# Patient Record
Sex: FEMALE | Race: BLACK OR AFRICAN AMERICAN | Employment: UNEMPLOYED | URBAN - METROPOLITAN AREA
[De-identification: names, ages, dates, MRNs, and addresses within clinical notes are randomized per-mention and may not be internally consistent; named-entity substitution may affect disease eponyms.]

---

## 2020-11-18 ENCOUNTER — HOSPITAL ENCOUNTER (OUTPATIENT)
Dept: PSYCHIATRY | Age: 25
Setting detail: THERAPIES SERIES
Discharge: HOME | End: 2020-11-18
Payer: COMMERCIAL

## 2020-11-18 PROCEDURE — 80305 DRUG TEST PRSMV DIR OPT OBS: CPT

## 2020-11-18 PROCEDURE — 90791 PSYCH DIAGNOSTIC EVALUATION: CPT

## 2020-11-18 RX ORDER — ARIPIPRAZOLE 20 MG/1
20 TABLET ORAL DAILY
COMMUNITY
End: 2021-10-08

## 2020-11-18 ASSESSMENT — PATIENT HEALTH QUESTIONNAIRE - PHQ9: SUM OF ALL RESPONSES TO PHQ QUESTIONS 1-9: 0

## 2020-11-18 ASSESSMENT — LIFESTYLE VARIABLES: HISTORY_ALCOHOL_USE: NO

## 2020-11-18 NOTE — PLAN OF CARE
This note will not be viewable in [employer] for the following reason(s). Suspected substance abuse disorder. [facility]- Level of Care Placement      [x]Admissions  []Continued Stay []Discharge/Transfer / Complication in [address] [address] HOLS:92/14/4117    Client Sticker      Level of Care Level 1      Outpatient Services Level 2.1   Intensive Outpatient Services(IOP) Level 2.5   Partial Hospitalization Services Level 3.1 CLINICALLY Managed Low-Intensity Residential Services Level 3.3  CLINICALLY Managed Population- Specific High- Intensity Residential Services Level 3.5  CLINICALLY Managed High Intensive Residential Services Level 3.7  MEDICALLY Monitored Intensive Inpatient Services Level 4  MEDICALLY Managed Intensive Inpatient Services   Dimension 1  Acute Intoxication and/or Withdrawal Potential [x] Not experiencing significant withdrawal    [] Minimal  risk of severe  withdrawal [] Minimal  risk of severe  withdrawal    [] Manageable  at Level 2-WM [] Moderate  risk of severe withdrawal    [] Manageable at Level 2-WM [] No withdrawal risk or minimal or stable withdrawal     [] Concurrently receiving Level -WM or Level 2-WM services [] Minimal risk of severe withdrawal    [] If withdrawal is present, manageable at Level 3. 2-WM  [] Minimal risk of severe withdrawal    [] If withdrawal is present manageable at Level 3. 2-WM [] High risk of withdrawal, but manageable at Level  3.7-WM and does not require  full resources of a licensed hospital [] At high risk of withdrawal and requires Level 4- and full resources of licensed hospital    COMMENTS:           Dimension 2   Biomedical Conditions and Complications  (BMC/C) [x] None or very stable    [] Receiving concurrent medical monitoring  [] None or not a distraction from treatment    [] Problems are manageable at Level 2.1 [] None or not sufficient to distract from treatment    [] Problems are manageable at  Level 2.5 [] None or stable    [] Receiving concurrent medical monitoring  [] None or stable    [] Receiving concurrent medical monitoring  [] None or stable    [] Receiving concurrent medical monitoring [] Requires 24-hour medical monitoring  but not intensive treatment [] Requires 24-hour medical & nursing care and the full  resources of a licensed hospital   COMMENTS:           Dimension 3  Emotional,  Behavioral,  or Cognitive Conditions and Complications   (EBC/C) [] None or very stable    [x] Receiving concurrent mental health monitoring [] Mild severity  with potential to distract from recovery; needs monitoring [] Mild to moderate severity, with potential to distract from recovery, needs stabilization [] None or minimal; not distracting to recovery    [] If  stable, a    co-occurring capable program is appropriate    [] If not stable, a co-occurring enhanced program is required [] Mild to moderate severity; needs structure to focus on recovery. Treatment should be designed to address significant cognitive deficits     [] If  stable, a  co-occurring capable program is appropriate    [] If not stable, a co-occurring enhanced program is required [] Demonstrates repeated inability to control impulses or unstable & dangerous signs/ symptoms require stabilization. Other functional deficits require stabilization and 24-hr.  setting to prepare for community integration  & continuing care    [] Co-occurring enhanced setting is required for those with severe & chronic mental illness [] Moderate severity;  needs 24-hour   structured setting    [] If client has co-occurring mental disorder, requires concurrent mental health services in a medically monitored setting  [] Severe and unstable problems;  requires 24-hour psychiatric care  with concomitant addiction treatment   COMMENTS:             Electronically signed by Marc Sandoval, TRACI, ISIDRO on 11/18/2020 at 3:36 PM               [address] [place] [address] Level of Care Placement      [x]Admissions  []Continued Stay []Discharge/Transfer / Complication in [address] [id number]:[id number]/[id number]    Client Sticker      Level of Care Level 1  Outpatient   Services Level 2.1  Intensive  Outpatient  Services Level 2.5  Partial Hospitalization Services Level 3.1  CLINICALLY Managed Low-intensity Residential Services Level 3.3  CLINICALLY Managed Population- Specific High- Intensity Residential Services Level 3.5  CLINICALLY Managed High-Intensive Residential Services Level 3.7  MEDICALLY Monitored Intensive Inpatient Services Level 4  MEDICALLY Managed Intensive Inpatient Services   Dimension 4  Readiness  To  Change [] Ready for recovery but needs motivating and monitoring strategies to strengthen readiness    []Needs ongoing monitoring and disease management    [] High severity in this dimension but not in other dimensions. Needs Level 1 motivational enhancement strategies   [x] Has variable engagement in treatment, ambivalence, or lack of awareness of substance use or mental health problems and requires structured program several times/wk. to promote progress through stages of change [] Has poor engagement in treatment, significant ambivalence, or lack of awareness of substance use or mental health problems, requires near daily structured program or intensive engagement to promote progress through stages of change [] Open to recovery, but needs structured environment to maintain therapeutic gains [] Has little awareness & needs interventions at Level 3.3 to engage & stay in treatment.     [] If there is high severity in this dimension, but not in any other dimension, motivational enhancement strategies should be provided in Level 1 [] Has marked difficulty with, or opposition to treatment with dangerous consequences    [] If there is high severity in this dimension, but not in any other dimension, motivational enhancement strategies should be provided in Level 1 [] Low interest in treatment 6  Recovery/Living Environment []  Recovery environment is supportive and/or the client has skills to cope [x] Recovery environment is not supportive  but with structure and support, the client can cope [] Recovery environment is not supportive, but with structure and support and relief from the home environment, client can cope [] Environment    is dangerous, but recovery is achievable if Level 3.1 24-hour structure is available  [] Environment is dangerous and  client needs 24-hour structure to learn to cope [] Environment is dangerous, and the client lacks skills to cope outside of a  highly structured 24-hour setting   [] Environment is dangerous, and the client lacks skills to cope outside of a  highly structured 24-hour setting [] Problems in this dimension do not qualify the client for Level 4 services    [] If the client's only severity is in Dimension 4,5, and/or 6 without high severity in Dimensions 1,2, and/or 3, then client is not qualified for Level 4   COMMENTS:               Electronically signed by TRACI Herndon, ISIDRO on 11/18/2020 at 3:36 PM

## 2020-11-18 NOTE — CARE COORDINATION
This note will not be viewable in [employer] for the following reason(s). Suspected substance abuse disorder. Patient PHQ 9 Score: 0  Interpretation of Total Score Depression Severity: 1-4 = Minimal depression, 5-9 = Mild depression, 10-14 = Moderate depression, 15-19 = Moderately severe depression, 20-27 = Severe depression. Patient's PHQ9 will be monitored as treatment proceeds.     Electronically signed by TRACI Herndon LSW on 11/18/2020 at 3:22 PM

## 2020-11-18 NOTE — CARE COORDINATION
This note will not be viewable in [employer] for the following reason(s). Suspected substance abuse disorder. Care Coordination Note:    Patient's Plan of Care: To attend the IOP program for Cannibals use.     Electronically signed by TRACI Mota, GWENW on 11/18/2020 at 3:34 PM negative...

## 2020-11-18 NOTE — CARE COORDINATION
This note will not be viewable in [employer] for the following reason(s). Suspected substance abuse disorder. Biopsychosocial Assessment Note    Name: America Rivera  Date: 11/18/2020  Start Time: 10:30  End Time:11:30    Social work met with patient to complete the biopsychosocial assessment and CSSR-S. Mental Status Exam: Patient presented with a jovial (immature) affect and stable mood. She noted she is on Abilify for Bipolar, but is not sure what time of the day her medication is to be taken daily. She was cooperative and pleasant with the assessment. She was oriented X 4 and was able to stay on topic. Presenting Problem: Patient was sent here by Marcelo Joseph because the patient was arrested for theft in a store and because the patient is a sporadic cannabis user. Patient Report and Notes: Patient noted she is here for an assessment by her PO. The patient noted the only drug she has abused is cannabis and the last time she used was close to a month ago. She noted she is not a daily or weekly user, but on occasion, less than a month and she will use with a friend and take one puff. She noted her use is influenced by peer pressure. She was screened today. She was taken aback as we informed her she would have to participate in our program, but she did agree to do so after consulting with Ekaterina Doss and myself.  The patient sees a nurse practioner at [facility] [place], she had been seeing a therapist and disclosed information to her, then the therapist quit and the patient is disgruntled about seeing a new therapist.     Gender  [] Male [x] Female [] Transgender  [] Other    Sexual Orientation    [x] Heterosexual [] Homosexual [] Bisexual [] Other    Suicidal Ideation  [] Reports   [x] Denies    Homicidal Ideation  [] Reports   [x] Denies      Hallucinations/Delusions   [] Reports   [x] Denies     Substance Use/Alcohol Use/Addiction  [x] Reports    [] Denies     Trauma History  [] Reports    [x] Denies     Plan of Care: Participate in IOP and attending group meetings in the community. Patient Goal: To participate in the IOP program    Patient PHQ 9 Score: 0  Interpretation of Total Score Depression Severity: 1-4 = Minimal depression, 5-9 = Mild depression, 10-14 = Moderate depression, 15-19 = Moderately severe depression, 20-27 = Severe depression  Her PHQ9 will be monitored as treatment proceeds. Preliminary Diagnosis and Criteria: F12.10        If session conducted via telehealth:    This session was conducted via: [] Video [] Telephone  Patient Location:  [] Treatment Center [] Home [] Other  Provider Location: [] Treatment Center [] Home [] Other    Signed: Isaiah Downing               11/18/2020

## 2020-11-24 ENCOUNTER — HOSPITAL ENCOUNTER (OUTPATIENT)
Dept: PSYCHIATRY | Age: 25
Setting detail: THERAPIES SERIES
Discharge: HOME | End: 2020-11-24
Payer: COMMERCIAL

## 2020-11-24 PROCEDURE — H0015 ALCOHOL AND/OR DRUG SERVICES: HCPCS

## 2020-11-24 NOTE — GROUP NOTE
This note will not be viewable in [facility] for the following reason(s). Suspected substance abuse disorder. MODES OF INTERVENTION: UNIVERSALITY, CATHARSIS, EXISTENTIAL FACTORS, ALTRUISM, AND INTERPERSONAL LEARNING. TOPIC: HOW SPIRITUALITY CAN HELP RECOVERY AND HOW SUBSTANCES EFFECTS SEX AND DECISION MAKING. OBJECTIVE: LEARNING WAYS TO AVOID RELAPSE AND MAINTAINING SOBER LIVING      This virtual group therapy visit was conducted via:     [] Telephone    [x] Video      Patient Location:     [x] Patient's Home  [] Other:      Provider Location (City/[facility]):     [facility]   [x] Nikos Brown         Date: 11-24-20    Start Time: 10:30    End Time:  11:55    Number of Participants: 7    Type of Group: Relapse Prevention    Patient's Goal:  To complete the IOP program    Notes:  As far a sexual intimacy and drug use Danae strongly denies she has used for sexual intimacy. She had used by peer pressure in the past.     She noted for spirituality she has been raised in the [employer] and focuses on self love, family and friends for support. She prays often to help her make the right decisions for her. Danae was active in this group being it was her first group. Status After Intervention:  Improved    Participation Level:  Active Listener and Interactive    Participation Quality: Appropriate, Attentive and Sharing    Speech:  normal    Thought Process/Content: Logical    Affective Functioning: Congruent    Mood: euthymic    Level of consciousness:  Alert, Oriented x4 and Attentive    Response to Learning: Able to verbalize current knowledge/experience, Able to verbalize/acknowledge new learning, Able to retain information, Capable of insight, Able to change behavior and Progressing to goal    Endings: None Reported    Modes of Intervention: Education, Support, Socialization, Exploration and Clarifying    Discipline Responsible: /Counselor       Electronically signed by TRACI Rodriguez, LSW on 11/24/2020 at 3:39 PM

## 2020-11-24 NOTE — GROUP NOTE
This note will not be viewable in [employer] for the following reason(s). Suspected substance abuse disorder. This virtual group therapy visit was conducted via:     [] Telephone    [x] Video      Patient Location:     [x] Patient's Home  [] Other:      Provider Location (City/[place]):     [] [place] [address] [place]         Date:  11-24-20    Start Time: 9:00    End Time:  10:15    Number of Participants: 7    Type of Group: Psychotherapy    Patient's Goal:  To attend IOP per her PO. Modes of intervention:instillation of hope,altruism, group cohesiveness, universality and interpersonal learning. Topic: Being Grateful  Objective: How gratefulness can help in recovery      Notes: This was Danae's first session. She was welcomed by other group members. She noted she was grateful for her family and friends. She denied the holidays are a trigger for her to use. He most recent drug test was negative and she was cheerful for the negative result. She noted her cannabis use is less than once per month and it is influenced by peer pressure. She is planning a joyous Thanksgiving with her family and prefers to eat seafood rather than traditional turkey. She did fairly well in group as she let other group members take the lead, but she did participate when asked to. She had not participated in any meetings so far. Status After Intervention:  Improved    Participation Level:  Active Listener and Interactive    Participation Quality: Appropriate, Attentive, Sharing and Supportive    Speech:  normal    Thought Process/Content: Logical    Affective Functioning: Congruent    Mood: anxious and euthymic    Level of consciousness:  Alert, Oriented x4 and Attentive    Response to Learning: Able to verbalize current knowledge/experience, Able to verbalize/acknowledge new learning, Able to retain information, Capable of insight, Able to change behavior and Progressing to goal    Endings: None Reported    Modes of

## 2020-12-01 ENCOUNTER — HOSPITAL ENCOUNTER (OUTPATIENT)
Dept: PSYCHIATRY | Age: 25
Setting detail: THERAPIES SERIES
Discharge: HOME | End: 2020-12-01
Payer: COMMERCIAL

## 2020-12-01 PROCEDURE — H0015 ALCOHOL AND/OR DRUG SERVICES: HCPCS

## 2020-12-01 NOTE — GROUP NOTE
This note will not be viewable in [employer] for the following reason(s). Suspected substance abuse disorder. Date-12-1-20  Topic Stress and emotional well being  Objective: To learn how to manage stress in preventing relapse  Modes of Interventions: Installation of hope  Imparting of information, altruism, correction of the primary family, and socialization. Number of participants: 7      This virtual group therapy visit was conducted via:     [] Telephone    [x] Video      Patient Location:     [x] Patient's Home  [] Other:      Provider Location (City/[facility]):     [] [facility]   [x] [facility]         Date:  12-1-20    Start Time: 10:30    End Time:  12:00    Number of Participants: 7    Type of Group: Relapse Prevention    Patient's Goal:  To successfully complete the program and stay sober. Notes:  Danae noted she was a social user and never used on her own or bought the cannabis. She felt peer pressure to use. She notes her stressors which may encourage use is anxiety and the covid crisis. She is not restricting herself from the covid and does go out of the home. She has two meetings to attend and is restricted to attend because of the covid. She has no urges to use and was not a moderate or sever user of cannabis. Status After Intervention:  Improved    Participation Level:  Active Listener and Interactive    Participation Quality: Appropriate, Attentive and Sharing    Speech:  normal    Thought Process/Content: Logical    Affective Functioning: Congruent    Mood: euthymic    Level of consciousness:  Alert, Oriented x4 and Attentive    Response to Learning: Able to verbalize current knowledge/experience, Able to verbalize/acknowledge new learning, Able to retain information, Capable of insight, Able to change behavior and Progressing to goal    Endings: None Reported    Modes of Intervention: Education, Support and Socialization    Discipline Responsible: /Counselor Electronically signed by TRACI Paredes, LSW on 12/1/2020 at 3:46 PM

## 2020-12-01 NOTE — GROUP NOTE
This note will not be viewable in [employer] for the following reason(s). This is a Psychotherapy Note. This virtual group therapy visit was conducted via:     [] Telephone    [x] Video      Patient Location:     [x] Patient's Home  [] Other:      Provider Location ([facility]/State):     [] [facility]   [x] Darrius Matos       Start Time: 900  End Time: 2553   Number of participants:6  Type of group: Psychotherapy  Mode of intervention: Education, Support, Socialization, Exploration, Clarifying, Problem-solving, Confrontation, Limit-setting, and Reality-testing  Topic: Psychotherapy - Emotional well-being & managing stress    Objective: To explore positive coping skills to manage stress without substance use    Note: Danae actively participated in group therapy session. She presented inappropriate with laying in bed and having on lingerie. Client was directed to go off zoom and re-connect with group once her appearance was appropriate for group. Danae presented in zoom distracted by her phone and other things in home and had to be re-directed several times in the sessions. When present in the session she  verbalized positive coping skills of watching comedies, and changing her thoughts on her stress as coping skills. Danae verbalized she has never used a substance to cope with stress. Status after intervention:Improved  Participation level:  Active Listener, Interactive and Minimal  Participation Quality: Attentive, Sharing and Inappropriate  Speech: normal  Thought Process/Content:Logical  Mood/Affect: congruent  Self report: None Reported  Response to learning: Able to verbalize current knowledge/experience, Able to verbalize/acknowledge new learning, Able to retain information, Capable of insight and Able to change behavior  Discipline Responsible: [place]  Electronically signed by Ashutosh Mena on 45/5/9283 at 11:37 AM

## 2020-12-02 NOTE — PLAN OF CARE
Client discussed in treatment team today.     Present:   Dr. Deja Baca M.D., Ena  [facility], [place] [address] [address]     Current Status: IOP      Treatment goals:    [] Relapse prevention  [x] Increase sober support  [] Increase attendance in sober support groups  [] [place] effectively with cravings and urges  [x] Other: Social Interaction   Recommendation: Danae is a new admission and is recommended to continue with program and complete 8 day education sessions.      Electronically signed by Katie Lou 320 on 00/0/8449 at 2:42 PM

## 2020-12-03 ENCOUNTER — HOSPITAL ENCOUNTER (OUTPATIENT)
Dept: PSYCHIATRY | Age: 25
Setting detail: THERAPIES SERIES
Discharge: HOME | End: 2020-12-03
Payer: COMMERCIAL

## 2020-12-03 PROCEDURE — 99203 OFFICE O/P NEW LOW 30 MIN: CPT | Performed by: FAMILY MEDICINE

## 2020-12-03 PROCEDURE — 80305 DRUG TEST PRSMV DIR OPT OBS: CPT

## 2020-12-03 PROCEDURE — H0015 ALCOHOL AND/OR DRUG SERVICES: HCPCS

## 2020-12-03 NOTE — H&P
[address]                              HISTORY AND PHYSICAL    PATIENT NAME: Deven Boudreaux                      :        1995  MED REC NO:   [id number]                            ROOM:  ACCOUNT NO:   [de-identified]                           ADMIT DATE: 2020  PROVIDER:     Gurinder Saleem MD    HISTORY OF PRESENT ILLNESS:  A 19-year-old female, admitted with a  history of chemical dependency. The patient states she has been using  marijuana for the last seven to eight years, has been using it once or  twice a month. Has been tested positive by the probation officers, they  have been sending for evaluation and treatment. The patient does not  give any relevant medical or surgical history. Does not give any  relevant family or social history. REVIEW OF SYSTEMS:  HEENT:  Normal.  HEART:  Normal.  LUNGS:  Normal.   ABDOMEN:  Normal:  EXTREMITIES:  Normal.  NEUROLOGIC:  Normal.    PHYSICAL EXAMINATION:  GENERAL:  At the time of admission, the patient is alert and conscious,  in no acute distress. Average built and nourished. No signs of anemia. VITAL SIGNS:  Temperature normal, pulse rate 80 per minute, respirations  20 per minute, and blood pressure 120/80. HEENT:   Within normal limits. HEART:  Normal sinus rhythm. No audible murmurs. LUNGS:  Clear. Fair, equal, and good air entry. ABDOMEN:  Soft, no organomegaly. No masses. EXTREMITIES:  Within normal limits. NEUROLOGICAL:  The patient is stable. FINAL DIAGNOSIS:  Chemical dependency. PLAN OF TREATMENT:  Rehabilitation and aftercare as per the counselor.    The patient is medically stable to attend outpatient treatment program.        Dylan Aleman MD    D: 2020 11:27:29       T: 2020 12:24:43     [email or web address]  Job#: [id number]     Doc#: [id number]    CC:

## 2020-12-03 NOTE — GROUP NOTE
Danae did not attend the first session of Psychotherapy due to transportation issues.   Electronically signed by Katie Burgos 320 on 76/7/7176 at 2:33 PM

## 2020-12-03 NOTE — GROUP NOTE
This note will not be viewable in [employer] for the following reason(s). Suspected substance abuse disorder. [id number]  Start Time 10:45  End Time: 12:00  Number of participants:7  Type of group: Relapse prevention  Goal: understanding addiction  Objective: processing an educational film on Alcoholic Anonymous  Mode of intervention:universally, installation of hope, altruism socialization and catharsis. Note: Danae had good insight into the educational film where the alcoholic had been controlled by the alcohol. Even though her use was not to the extenet of this  alcoholics she processed the dangers that substances can have over people. Status after intervention:Improved  Participation level:  Active Listener and Interactive  Participation Quality: Appropriate, Attentive and Sharing  Speech: normal  Thought Process/Content:Logical  Mood/Affect: calm, congruent and spontaneous  Self report: None Reported  Response to learning: Able to verbalize current knowledge/experience, Able to verbalize/acknowledge new learning, Able to retain information, Capable of insight, Able to change behavior and Progressing to goal  Discipline Responsible: /Counselor     Electronically signed by TRACI Rose, ISIDRO on 12/3/2020 at 3:05 PM

## 2020-12-08 ENCOUNTER — HOSPITAL ENCOUNTER (OUTPATIENT)
Dept: PSYCHIATRY | Age: 25
Setting detail: THERAPIES SERIES
Discharge: HOME | End: 2020-12-08
Payer: COMMERCIAL

## 2020-12-08 PROCEDURE — H0015 ALCOHOL AND/OR DRUG SERVICES: HCPCS

## 2020-12-08 NOTE — GROUP NOTE
Start Time: 900  End Time: 1200   Number of participants:6  Type of group: Psychotherapy  Mode of intervention: Education, Support, Socialization, Exploration, Clarifying, Problem-solving, Confrontation, Limit-setting, and Reality-testing  Topic: Psychotherapy - Character defects   Objective: To explore character flaws that hinder recovery efforts and develop skills to eliminate these defects. Note: Danae attended the session 20 minutes late and was given verbal warning regardning her poor time management as it relates to attending group. She actively participated in the group session. she presented cooperative and quiet. She identified her character defect as being          \" a follower\" He identified and processed how being \"a follower\" led to her marijuana use and being with negative peers. She identified and processed removing her self from people that use drugs and to not be a follower of negative behaviors as skills to remove her defect. Status after intervention:Improved  Participation level:  Active Listener and Interactive  Participation Quality: Appropriate, Attentive and Sharing  Speech: normal  Thought Process/Content:Logical  Mood/Affect: quiet  Self report: None Reported  Response to learning: Able to verbalize current knowledge/experience, Able to verbalize/acknowledge new learning, Able to retain information, Capable of insight and Able to change behavior  Discipline Responsible: Katie Tanner 320  Electronically signed by Eber Maldonado on 93/4/3067 at 1:05 PM

## 2020-12-08 NOTE — GROUP NOTE
This note will not be viewable in [email or web address] for the following reason(s). Suspected substance abuse disorder. Date: 12-8-20  Start Time:11:00  End Time: 12:00  number of participants: 6  Mode of interventions: socialization, group cohesiveness, interpersonal learning, catharsis, and imparting of information. Topic: Stress and Emotional well being  Objective: evaluating healthy thinking patterns vs unhealthy thinking patterns. Note: Danae had good insight into group. She noted life is stressful and our responses to it have not always been good. We processed four coping skills, repression, regression, emotional insulation and intellectualism. Her response was appropriate as she was able to distinguish the differences between the  Four types of defense mechanisms. Status after intervention:Improved  Participation level:  Active Listener and Interactive  Participation Quality: Appropriate, Attentive and Sharing  Speech: normal  Thought Process/Content:Logical  Mood/Affect: calm, congruent and spontaneous  Self report: None Reported  Response to learning: Able to verbalize current knowledge/experience, Able to verbalize/acknowledge new learning, Able to retain information, Capable of insight, Able to change behavior and Progressing to goal  Discipline Responsible: /Counselor     Electronically signed by TRACI Rodriguez, GWENW on 12/8/2020 at 1:49 PM

## 2020-12-10 ENCOUNTER — HOSPITAL ENCOUNTER (OUTPATIENT)
Dept: PSYCHIATRY | Age: 25
Setting detail: THERAPIES SERIES
Discharge: HOME | End: 2020-12-10
Payer: COMMERCIAL

## 2020-12-10 PROCEDURE — H0015 ALCOHOL AND/OR DRUG SERVICES: HCPCS

## 2020-12-10 NOTE — GROUP NOTE
This note will not be viewable in [employer] for the following reason(s). Suspected substance abuse disorder. Start Time: 1030   End Time: 1145  End Number of participants:3  Type of group: Relapse Prevention  Mode of intervention: Education, Support, Socialization, Exploration, Clarifying, Problem-solving, Media, Confrontation, Limit-setting, and Reality-testing  Topic: Building Sober Support  Objective: To increase clients awareness on how sober support can aid relapse prevention by decreasing isolation and providing positive peers to interact with  to avoid continued association with using peers. Note: Danae actively participated in group session. She presented cooperative and appropriate. Client was asked to view education \" [employer] viewed education and verbalized the benefits of building sober support as related to her own life and she identified how being with positive peers will not tempt her to use marijuana and will support her sober lifestyle and help get off probation. Status after intervention:Improved  Participation level:  Active Listener and Interactive  Participation Quality: Appropriate, Attentive, Sharing and Supportive  Speech: normal  Thought Process/Content:Logical  Mood/Affect: brightens and congruent  Self report: None Reported  Response to learning: Able to verbalize current knowledge/experience, Able to verbalize/acknowledge new learning, Able to retain information, Capable of insight and Able to change behavior  Discipline Responsible: [place]  Electronically signed by Catalina Laurent on 18/62/8830 at 3:45 PM

## 2020-12-14 ENCOUNTER — HOSPITAL ENCOUNTER (OUTPATIENT)
Dept: PSYCHIATRY | Age: 25
Setting detail: THERAPIES SERIES
Discharge: HOME | End: 2020-12-14
Payer: COMMERCIAL

## 2020-12-14 PROCEDURE — H0015 ALCOHOL AND/OR DRUG SERVICES: HCPCS

## 2020-12-14 NOTE — GROUP NOTE
This note will not be viewable in [employer] for the following reason(s). Suspected substance abuse disorder. Start Time: 1030  End Time: 1200  Number of participants:5  Type of group: Relapse Prevention  Mode of intervention: Education, Support, Socialization, Exploration, Clarifying, Problem-solving, Confrontation, Limit-setting, and Reality-testing  Topic: Substance use , Abuse, Substance Dependence  Objective: To define the nature of the problem of substance abuse and addiction. Note: Danae actively participated in the group session. She presented appropriate and cooperative. Danae identified and verbalized how she abused marijuana due to illicit substance use, and  She verbalized how using marijuana could interfere with her depression medication and acknowledged how past marijuana use has made her mood unstable. Danae verbalized that she plans to stay free from all substance use to reduce negative consequences in her life. Status after intervention:Improved  Participation level:  Active Listener and Interactive  Participation Quality: Appropriate, Attentive, Sharing and Supportive  Speech: normal  Thought Process/Content:Logical  Mood/Affect: congruent  Self report: None Reported  Response to learning: Able to verbalize current knowledge/experience, Able to verbalize/acknowledge new learning, Able to retain information, Capable of insight, Able to change behavior and Progressing to goal  Discipline Responsible: [place]  Electronically signed by Milton Bui on 90/41/2376 at 1:45 PM

## 2020-12-17 ENCOUNTER — HOSPITAL ENCOUNTER (OUTPATIENT)
Dept: PSYCHIATRY | Age: 25
Setting detail: THERAPIES SERIES
Discharge: HOME | End: 2020-12-17
Payer: COMMERCIAL

## 2020-12-17 NOTE — GROUP NOTE
This note will not be viewable in [employer] for the following reason(s). Suspected substance abuse disorder. Date: 12-17-20  Start Time: 10:30  End Time:12:00  Number of participants: 4  Mode of interventions: socialization, interpersonal learning,group cohesiveness, catharsis, imparting of information and altruism. Type of group: Relapse Prevention  Topic: controlling emotions and maintaining happiness when stressed. Objective: to prevent relapse during stressful circumstances in life. Note: Danae had adequate group participation. She identified areas in her life which are toxic. She will keep away from peers who use to keep sober. She is intent on not using after treatment concludes. We reviewed techniques to assist with stress reduction. She keeps herself grounded by having calm in her life. She treats others as she would like to be treated. Her attitude was somewhat focused during most of the group. Status after intervention:Improved  Participation level:  Active Listener and Interactive  Participation Quality: Appropriate, Attentive and Sharing  Speech: normal  Thought Process/Content:Logical  Mood/Affect: brightens, calm and congruent  Self report: None Reported  Response to learning: Able to verbalize current knowledge/experience, Able to verbalize/acknowledge new learning, Able to retain information, Capable of insight, Able to change behavior and Progressing to goal  Discipline Responsible: /Counselor     Electronically signed by TRACI Duong LSW on 12/17/2020 at 3:43 PM

## 2020-12-21 ENCOUNTER — APPOINTMENT (OUTPATIENT)
Dept: PSYCHIATRY | Age: 25
End: 2020-12-21
Payer: COMMERCIAL

## 2020-12-21 ENCOUNTER — HOSPITAL ENCOUNTER (OUTPATIENT)
Dept: PSYCHIATRY | Age: 25
Setting detail: THERAPIES SERIES
Discharge: HOME | End: 2020-12-21
Payer: COMMERCIAL

## 2020-12-21 PROCEDURE — H0015 ALCOHOL AND/OR DRUG SERVICES: HCPCS

## 2020-12-21 NOTE — GROUP NOTE
This note will not be viewable in [employer] for the following reason(s). Suspected substance abuse disorder. Start : 900  End Time: 1026  Number of participants:6  Type of group: Psychotherapy  Mode of intervention: Education, Support, Socialization, Exploration, Clarifying, Problem-solving, Confrontation, Limit-setting, and Reality-testing  Topic: Psychotherapy - Coping skills   Objective: To be able to identify healthy vs. Unhealthy coping skills to make healthy choices in decision making to support recovery. Note: Danae actively participated in the session. She presented cooperative and pleasant. Danae identifed she overeats to handle feelings at times and she explored how it has led to unwanted weight gain that negatively effects her self esteem and she discussed alternative ways to manage emotions without the use of food. Group members provided feedback and alternative coping skills. Status after intervention:Improved  Participation level:  Active Listener and Interactive  Participation Quality: Appropriate, Attentive, Sharing and Supportive  Speech: normal  Thought Process/Content:Logical  Mood/Affect: congruent  Self report: None Reported  Response to learning: Able to verbalize current knowledge/experience, Able to verbalize/acknowledge new learning, Able to retain information, Capable of insight, Able to change behavior and Progressing to goal  Discipline Responsible: [address]  Electronically signed by Magnus Khoury on 54/89/9029 at 12:48 PM

## 2020-12-22 ENCOUNTER — HOSPITAL ENCOUNTER (OUTPATIENT)
Dept: PSYCHIATRY | Age: 25
Setting detail: THERAPIES SERIES
Discharge: HOME | End: 2020-12-22
Payer: COMMERCIAL

## 2020-12-22 PROCEDURE — H0015 ALCOHOL AND/OR DRUG SERVICES: HCPCS

## 2020-12-22 NOTE — GROUP NOTE
This note will not be viewable in [employer] for the following reason(s). This is a Psychotherapy Note. Start Time: 900  End Time: 1027  Number of participants:5  Type of group: Psychotherapy  Mode of intervention: Education, Support, Socialization, Exploration, Clarifying, Problem-solving, Confrontation, Limit-setting, and Reality-testing  Topic: Psychotherapy   Objective: To explore strengths that can aid maintaining sobriety. Note: Danae actively participated in the group session. She showed verification of attending 1 support group meeting prior to this session and shared with the group the coping skills she learned form the support group. Danae presented cooperative and pleasant and she identified her strengths is perserverence and she explored how she used this strength to not give up on herself and she described to the group how coming to group was difficult for her , however she expressed how she did not give up and used the group as toll to aid her new sober lifestyle changes. Status after intervention:Improved  Participation level:  Active Listener and Interactive  Participation Quality: Appropriate, Attentive, Sharing and Supportive  Speech: normal  Thought Process/Content:Logical  Mood/Affect: congruent  Self report: None Reported  Response to learning: Able to verbalize current knowledge/experience, Able to verbalize/acknowledge new learning, Able to retain information, Capable of insight, Able to change behavior and Progressing to goal  Discipline Responsible: [address]  Electronically signed by Lefty Hermosillo on 73/30/2115 at 1:49 PM

## 2020-12-24 ENCOUNTER — HOSPITAL ENCOUNTER (OUTPATIENT)
Dept: PSYCHIATRY | Age: 25
Setting detail: THERAPIES SERIES
Discharge: HOME | End: 2020-12-24
Payer: COMMERCIAL

## 2020-12-24 PROCEDURE — H0015 ALCOHOL AND/OR DRUG SERVICES: HCPCS

## 2020-12-24 PROCEDURE — 80305 DRUG TEST PRSMV DIR OPT OBS: CPT

## 2020-12-24 ASSESSMENT — PATIENT HEALTH QUESTIONNAIRE - PHQ9: SUM OF ALL RESPONSES TO PHQ QUESTIONS 1-9: 0

## 2020-12-24 NOTE — GROUP NOTE
This note will not be viewable in [employer] for the following reason(s). Suspected substance abuse disorder. Start Time : 900  End Time : 6385   Number of participants:3  Type of group: Psychotherapy  Mode of intervention: Education, Support, Socialization, Exploration, Clarifying, Problem-solving, Confrontation, Limit-setting, and Reality-testing  Topic: Psychotherapy  Objective: To explore  and understand self-will and the impact on the recovery and addiction. Note: Danae actively participated in the group session. She presented pleasant and cooperative. Danae identified and explored how her self-will allowed her to think it was acceptable to associate with negative peers that use drugs which led to her use of substances and legal issues. Danae was scarlett to accept feedback and share positive boundaries she has put in place to avoid relapse and negative peer association to support her recovery. Status after intervention:Improved  Participation level:  Active Listener and Interactive  Participation Quality: Appropriate, Attentive, Sharing and Supportive  Speech: normal  Thought Process/Content:Logical  Mood/Affect: brightens  Self report: None Reported  Response to learning: Able to verbalize current knowledge/experience, Able to verbalize/acknowledge new learning, Able to retain information, Capable of insight, Able to change behavior and Progressing to goal  Discipline Responsible: [place]  Electronically signed by Lisa Jhaveri on 00/75/1313 at 10:45 AM

## 2020-12-24 NOTE — GROUP NOTE
This note will not be viewable in [employer] for the following reason(s). Suspected substance abuse disorder. Date: 12-24-20  Start Time: 10:30   End Time: 11:30  Number of participants: 3  Topic: coping with the holidays  Objective: working through holiday stress while maintaining sobriety. Mode of interventions: universality, correction of the primary family, interpersonal learning, catharsis and group cohesiveness. Note: Danae was insightful iin group providing specific areas on how she will negotiate the holiday season. She will avoid family conflicts and surround herself with positive sober peers. Status after intervention:Improved  Participation level:  Active Listener and Interactive  Participation Quality: Appropriate, Attentive, Sharing and Supportive  Speech: normal  Thought Process/Content:Logical  Mood/Affect: calm, congruent and spontaneous  Self report: None Reported  Response to learning: Able to verbalize current knowledge/experience, Able to verbalize/acknowledge new learning, Able to retain information, Capable of insight, Able to change behavior and Progressing to goal  Discipline Responsible: /Counselor     Electronically signed by TRACI Sommer, GWENW on 12/24/2020 at 11:46 AM

## 2021-01-07 ENCOUNTER — HOSPITAL ENCOUNTER (OUTPATIENT)
Dept: PSYCHIATRY | Age: 26
Setting detail: THERAPIES SERIES
Discharge: HOME | End: 2021-01-07
Payer: COMMERCIAL

## 2021-01-07 NOTE — DISCHARGE SUMMARY
This note will not be viewable in [employer] for the following reason(s). Suspected substance abuse disorder. [address] [place] [address]    Client Name: Marely Quinteros Date of Admission: 11-9-20    Discharge Date: 12-24-20    Diagnosis: F12.10  Authorized Persons Name/License/Date: Matteo SALAS  Degree of Severity - Admission None Low Moderate High Degree of Severity - Discharge None Low Moderate High   Acute Intoxication withdrawal X    Acute Intoxication withdrawal X      Biomedical Conditions/Complications X    Biomedical Conditions/Complications X      Emotional Behavioral/Cognitive Conditions  X   Emotional Behavioral/Cognitive Conditions  X     Treatment Acceptance Resistance X    Treatment Acceptance Resistance X      Relapse Potential     X  Relapse Potential  X     Recovery Environment    X   Recovery Environment  X       Comments on Dimensional Criteria: #1 Danae reported no withdrawals on admission or discharge. #2 Danae reported no Biomedical conditions. #3 Danae is being seen for mental health treatment at [facility]. #4 Client demonstrated low resistance to the program, and he was able accept treatment and utilized the group process effectively. #5 Danae has attended (1) required AA/NA meetings for the treatment requirement and her risk for relapse is low due to client making good progress and demonstrating insight into her poor choices and developing coping skills. #6 Clients environment is low due to client reports there is no substance use in her environment.

## 2021-10-08 ENCOUNTER — HOSPITAL ENCOUNTER (EMERGENCY)
Age: 26
Discharge: HOME | End: 2021-10-08
Attending: EMERGENCY MEDICINE
Payer: COMMERCIAL

## 2021-10-08 VITALS
TEMPERATURE: 97.5 F | WEIGHT: 220 LBS | HEART RATE: 96 BPM | OXYGEN SATURATION: 96 % | RESPIRATION RATE: 16 BRPM | SYSTOLIC BLOOD PRESSURE: 112 MMHG | DIASTOLIC BLOOD PRESSURE: 80 MMHG

## 2021-10-08 DIAGNOSIS — Z20.822 SUSPECTED COVID-19 VIRUS INFECTION: Primary | ICD-10-CM

## 2021-10-08 PROCEDURE — U0003 INFECTIOUS AGENT DETECTION BY NUCLEIC ACID (DNA OR RNA); SEVERE ACUTE RESPIRATORY SYNDROME CORONAVIRUS 2 (SARS-COV-2) (CORONAVIRUS DISEASE [COVID-19]), AMPLIFIED PROBE TECHNIQUE, MAKING USE OF HIGH THROUGHPUT TECHNOLOGIES AS DESCRIBED BY CMS-2020-01-R: HCPCS

## 2021-10-08 PROCEDURE — U0005 INFEC AGEN DETEC AMPLI PROBE: HCPCS

## 2021-10-08 PROCEDURE — 99282 EMERGENCY DEPT VISIT SF MDM: CPT

## 2021-10-08 RX ORDER — BROMPHENIRAMINE MALEATE, PSEUDOEPHEDRINE HYDROCHLORIDE, AND DEXTROMETHORPHAN HYDROBROMIDE 2; 30; 10 MG/5ML; MG/5ML; MG/5ML
5 SYRUP ORAL 4 TIMES DAILY PRN
Qty: 120 ML | Refills: 0 | Status: SHIPPED | OUTPATIENT
Start: 2021-10-08 | End: 2021-10-18

## 2021-10-08 RX ORDER — IBUPROFEN 800 MG/1
800 TABLET ORAL EVERY 8 HOURS PRN
Qty: 21 TABLET | Refills: 0 | Status: SHIPPED | OUTPATIENT
Start: 2021-10-08 | End: 2021-10-15

## 2021-10-08 ASSESSMENT — ENCOUNTER SYMPTOMS
WHEEZING: 0
SINUS PRESSURE: 0
ABDOMINAL DISTENTION: 0
NAUSEA: 0
COUGH: 1
DIARRHEA: 0
BACK PAIN: 0
EYE DISCHARGE: 0
SHORTNESS OF BREATH: 0
EYE REDNESS: 0
SORE THROAT: 1
EYE PAIN: 0
VOMITING: 0

## 2021-10-08 ASSESSMENT — PAIN DESCRIPTION - FREQUENCY: FREQUENCY: INTERMITTENT

## 2021-10-08 ASSESSMENT — PAIN DESCRIPTION - DESCRIPTORS: DESCRIPTORS: ACHING

## 2021-10-08 ASSESSMENT — PAIN DESCRIPTION - PAIN TYPE: TYPE: ACUTE PAIN

## 2021-10-08 ASSESSMENT — PAIN SCALES - GENERAL: PAINLEVEL_OUTOF10: 5

## 2021-10-08 ASSESSMENT — PAIN DESCRIPTION - LOCATION: LOCATION: GENERALIZED

## 2021-10-08 NOTE — Clinical Note
Miguel Vázquez was seen and treated in our emergency department on 10/8/2021. She may return to work on 10/11/2021. MUST REMAIN IN ISOLATION (per CDC)     until a NEGATIVE result                                                                                                                                                                                          (result in 2-3 days; return to work if negative)    OR . . . a POSITIVE result with:    \" 10 days passed from date symptoms first appeared,  \" at least 24 hours with no fever (without the use of fever-reducing medication)   \" AND symptoms improved         If you have any questions or concerns, please don't hesitate to call.       Jimmie Victoria, DO

## 2021-10-08 NOTE — ED PROVIDER NOTES
The history is provided by the patient. Illness   The current episode started 5 to 7 days ago. The onset was gradual. The problem occurs occasionally. The problem is mild. Nothing relieves the symptoms. Nothing aggravates the symptoms. Associated symptoms include congestion, sore throat, cough and URI. Pertinent negatives include no fever, no diarrhea, no nausea, no vomiting, no ear pain, no headaches, no wheezing, no rash, no eye discharge, no eye pain and no eye redness. She has been less active. She has been eating and drinking normally. Urine output has been normal. There were sick contacts at work. She has received no recent medical care. Review of Systems   Constitutional: Negative for chills and fever. HENT: Positive for congestion and sore throat. Negative for ear pain and sinus pressure. Eyes: Negative for pain, discharge and redness. Respiratory: Positive for cough. Negative for shortness of breath and wheezing. Cardiovascular: Negative for chest pain. Gastrointestinal: Negative for abdominal distention, diarrhea, nausea and vomiting. Genitourinary: Negative for dysuria and frequency. Musculoskeletal: Negative for arthralgias and back pain. Skin: Negative for rash and wound. Neurological: Negative for weakness and headaches. Hematological: Negative for adenopathy. Psychiatric/Behavioral: Negative. All other systems reviewed and are negative. Physical Exam  Vitals and nursing note reviewed. Constitutional:       Appearance: She is well-developed. HENT:      Head: Normocephalic and atraumatic. Right Ear: Tympanic membrane normal.      Left Ear: Tympanic membrane normal.      Nose: Congestion and rhinorrhea present. Mouth/Throat:      Pharynx: Posterior oropharyngeal erythema present. Eyes:      Pupils: Pupils are equal, round, and reactive to light. Cardiovascular:      Rate and Rhythm: Normal rate and regular rhythm.       Heart sounds: Normal details for the plan of care and counseling regarding the diagnosis and prognosis. Their questions are answered at this time and they are agreeable with the plan.      --------------------------------- ADDITIONAL PROVIDER NOTES ---------------------------------        This patient is stable for discharge. I have shared the specific conditions for return, as well as the importance of follow-up. IMPRESSION:     1.  Suspected COVID-19 virus infection      Patient's Medications   New Prescriptions    BROMPHENIRAMINE-PSEUDOEPHEDRINE-DM 2-30-10 MG/5ML SYRUP    Take 5 mLs by mouth 4 times daily as needed for Congestion or Cough    IBUPROFEN (IBU) 800 MG TABLET    Take 1 tablet by mouth every 8 hours as needed for Pain   Previous Medications    CARIPRAZINE HCL (VRAYLAR) 3 MG CAPS CAPSULE    Take 3 mg by mouth daily   Modified Medications    No medications on file   Discontinued Medications    ARIPIPRAZOLE (ABILIFY) 20 MG TABLET    Take 20 mg by mouth daily         Procedures     [facility]                   Jerrica Delatorre DO  10/08/21 [id number]

## 2021-10-10 LAB — SARS-COV-2, PCR: DETECTED

## 2021-10-11 ENCOUNTER — CARE COORDINATION (OUTPATIENT)
Dept: CARE COORDINATION | Age: 26
End: 2021-10-11

## 2021-10-11 NOTE — CARE COORDINATION
Patient contacted regarding COVID-19 diagnosis. Discussed COVID-19 related testing which was available at this time. Test results were positive. Patient informed of results, if available? Yes. Ambulatory Care Manager contacted the patient by telephone to perform post discharge assessment. Call within 2 business days of discharge: Yes. Verified name and  with patient as identifiers. Provided introduction to self, and explanation of the CTN/ACM role, and reason for call due to risk factors for infection and/or exposure to COVID-19. Symptoms reviewed with patient who verbalized the following symptoms: fatigue, cough, shortness of breath, diarrhea, no new symptoms and no worsening symptoms. Due to no new or worsening symptoms encounter was not routed to provider for escalation. Discussed follow-up appointments. If no appointment was previously scheduled, appointment scheduling offered: Yes. [facility] follow up appointment(s): No future appointments. Non-[facility] follow up appointment(s): na    Non-face-to-face services provided:  Obtained and reviewed discharge summary and/or continuity of care documents     Advance Care Planning:   Does patient have an Advance Directive:  reviewed and needs to be updated. Information updated. Educated patient about risk for severe COVID-19 due to risk factors according to CDC guidelines. ACM reviewed discharge instructions, medical action plan and red flag symptoms with the patient who verbalized understanding. Discussed COVID vaccination status: Yes. Education provided on COVID-19 vaccination as appropriate. Discussed exposure protocols and quarantine with CDC Guidelines. Patient was given an opportunity to verbalize any questions and concerns and agrees to contact ACM or health care provider for questions related to their healthcare.     Reviewed and educated patient on any new and changed medications related to discharge diagnosis     Was patient discharged with a pulse oximeter? No        ACM provided contact information. No further follow-up call identified based on severity of symptoms and risk factors.

## 2023-09-22 ENCOUNTER — TELEPHONE (OUTPATIENT)
Age: 28
End: 2023-09-22

## 2023-09-22 NOTE — TELEPHONE ENCOUNTER
Tell her her to check a home pregnancy test.  If her pain is a 5 or higher, go to the ER. Otherwise, keep her appointment here.

## 2023-09-22 NOTE — TELEPHONE ENCOUNTER
Reason for visit: 8 day late, put patient on scheduled for 9/25/2023    Irregular periods, she has been seen previously for this  No pregnancy test    1. Why is patient calling? Pain:   unsure                 Bleeding:   no                 Other:   as above  EXPLAIN IN DETAIL                  When did the problem start (time and date)? 9/14/23     2. Is patient pregnant?  unsure    3. If patient is complaining of pain, COMPLETE ALL OF THESE:       Level of pain (from 1-10): 4      lower abdomen discomfort. BM 9/22/23  Passing gas  Nothing that makes the discomfort better or worse   has not taken over meds     4. Does the patient have a fever?   no    5. Does the patient have chills? No    6. Is the patient calling for a refill on medication? No    AT END OF ENCOUNTER, IF PATIENT IS COMPLAINING OF PAIN LEVEL 5 OR HIGHER OR BLEEDING SO AS TO SOAK A PAD AN HOUR, LET DOCTOR KNOW IMMEDIATELY, SO A DISPOSITION CAN BE MADE.     IF PATIENT IS PREGNANT AND OVER 20 WEEKS, AND CALLING WITH HEADACHE, CONTRACTIONS,BLEEDING,LEAKING FLUID,CHEST PAIN, OR DIFFICULTY BREATHING, LET DOCTOR KNOW RIGHT AWAY       Will pull chart

## 2023-09-24 NOTE — PROGRESS NOTES
[employer] [facility]     Patient presents for lack of a menstrual cycle. Her last menstrual period was 8/17/2023. Her cycles are usually every month, but they are changeable. She also complains of a vaginal discharge. No nausea or vomiting. No shoulder pain. No syncope or presyncope. No pelvic pain. No change in her bowel or urinary habits. No personal or family history of VTE. No viral prodrome. No exposure to any medications other than Vraylar since July 2023. She does take Vraylar daily. Past Medical History:   Diagnosis Date    Depression         History reviewed. No pertinent surgical history. History reviewed. No pertinent family history. Social History       Tobacco History       Smoking Status  Never      Smokeless Tobacco Use  Never              Alcohol History       Alcohol Use Status  No              Drug Use       Drug Use Status  No              Sexual Activity       Sexually Active  Never                      Current Outpatient Medications:     cariprazine hcl (VRAYLAR) 3 MG CAPS capsule, Take 1 capsule by mouth daily, Disp: , Rfl:     ibuprofen (IBU) 800 MG tablet, Take 1 tablet by mouth every 8 hours as needed for Pain, Disp: 21 tablet, Rfl: 0     No Known Allergies     Review of Systems   Constitutional:  Negative for appetite change, chills and fever. Gastrointestinal:  Negative for abdominal distention, abdominal pain, blood in stool, constipation, diarrhea, nausea, rectal pain and vomiting. Endocrine: Negative for heat intolerance, polydipsia, polyphagia and polyuria. Genitourinary:  Positive for menstrual problem and vaginal discharge. Negative for decreased urine volume, difficulty urinating, dysuria, flank pain, frequency, genital sores, hematuria and urgency. Hematological:  Negative for adenopathy. Does not bruise/bleed easily.         Vitals:    09/25/23 1509   BP: (!) 145/86   Pulse: 68        Physical Exam:  Physical Exam   Normal: Well developed

## 2023-09-25 ENCOUNTER — OFFICE VISIT (OUTPATIENT)
Age: 28
End: 2023-09-25
Payer: COMMERCIAL

## 2023-09-25 VITALS
HEIGHT: 65 IN | BODY MASS INDEX: 33.36 KG/M2 | HEART RATE: 68 BPM | WEIGHT: 200.2 LBS | DIASTOLIC BLOOD PRESSURE: 86 MMHG | SYSTOLIC BLOOD PRESSURE: 145 MMHG

## 2023-09-25 DIAGNOSIS — B37.9 YEAST INFECTION: ICD-10-CM

## 2023-09-25 DIAGNOSIS — N91.5 SCANTY OR INFREQUENT MENSTRUATION: Primary | ICD-10-CM

## 2023-09-25 LAB
AMPHETAMINE SCREEN URINE: NEGATIVE
BARBITURATE SCREEN URINE: NEGATIVE
BENZODIAZEPINE SCREEN, URINE: NEGATIVE
BUPRENORPHINE URINE: NEGATIVE
CANNABINOID SCREEN URINE: POSITIVE
COCAINE METABOLITE, URINE: NEGATIVE
CONTROL: NORMAL
FENTANYL URINE: NEGATIVE
METHADONE SCREEN, URINE: NEGATIVE
OPIATES, URINE: NEGATIVE
OXYCODONE SCREEN URINE: NEGATIVE
PHENCYCLIDINE, URINE: NEGATIVE
PREGNANCY TEST URINE, POC: POSITIVE
TEST INFORMATION: ABNORMAL

## 2023-09-25 PROCEDURE — 1036F TOBACCO NON-USER: CPT | Performed by: LEGAL MEDICINE

## 2023-09-25 PROCEDURE — G8417 CALC BMI ABV UP PARAM F/U: HCPCS | Performed by: LEGAL MEDICINE

## 2023-09-25 PROCEDURE — 99214 OFFICE O/P EST MOD 30 MIN: CPT | Performed by: LEGAL MEDICINE

## 2023-09-25 PROCEDURE — G8427 DOCREV CUR MEDS BY ELIG CLIN: HCPCS | Performed by: LEGAL MEDICINE

## 2023-09-25 PROCEDURE — 81025 URINE PREGNANCY TEST: CPT | Performed by: LEGAL MEDICINE

## 2023-09-25 SDOH — ECONOMIC STABILITY: FOOD INSECURITY: WITHIN THE PAST 12 MONTHS, YOU WORRIED THAT YOUR FOOD WOULD RUN OUT BEFORE YOU GOT MONEY TO BUY MORE.: NEVER TRUE

## 2023-09-25 SDOH — ECONOMIC STABILITY: FOOD INSECURITY: WITHIN THE PAST 12 MONTHS, THE FOOD YOU BOUGHT JUST DIDN'T LAST AND YOU DIDN'T HAVE MONEY TO GET MORE.: NEVER TRUE

## 2023-09-25 SDOH — ECONOMIC STABILITY: INCOME INSECURITY: HOW HARD IS IT FOR YOU TO PAY FOR THE VERY BASICS LIKE FOOD, HOUSING, MEDICAL CARE, AND HEATING?: NOT HARD AT ALL

## 2023-09-25 SDOH — ECONOMIC STABILITY: HOUSING INSECURITY
IN THE LAST 12 MONTHS, WAS THERE A TIME WHEN YOU DID NOT HAVE A STEADY PLACE TO SLEEP OR SLEPT IN A SHELTER (INCLUDING NOW)?: NO

## 2023-09-25 ASSESSMENT — ENCOUNTER SYMPTOMS
ABDOMINAL PAIN: 0
NAUSEA: 0
VOMITING: 0
DIARRHEA: 0
BLOOD IN STOOL: 0
CONSTIPATION: 0
RECTAL PAIN: 0
ABDOMINAL DISTENTION: 0

## 2023-09-25 ASSESSMENT — PATIENT HEALTH QUESTIONNAIRE - PHQ9
2. FEELING DOWN, DEPRESSED OR HOPELESS: 1
1. LITTLE INTEREST OR PLEASURE IN DOING THINGS: 0
SUM OF ALL RESPONSES TO PHQ QUESTIONS 1-9: 1
SUM OF ALL RESPONSES TO PHQ9 QUESTIONS 1 & 2: 1
SUM OF ALL RESPONSES TO PHQ QUESTIONS 1-9: 1

## 2023-09-25 NOTE — PATIENT INSTRUCTIONS
Pelvic ultrasound done this week please. I will call you with the report. If they do not see a gestational sac, I will order blood work for close follow-up. I will see you back next week. Call me if you have any pain or discomfort.

## 2023-09-26 ENCOUNTER — TELEPHONE (OUTPATIENT)
Age: 28
End: 2023-09-26

## 2023-09-26 NOTE — TELEPHONE ENCOUNTER
1. Why is patient calling? Pain: Yes                             Bleeding: No     EXPLAIN IN DETAIL: Patient is calling to see what she can take for breast pain. She is 5 weeks pregnant. She goes for a ultrasound tomorrow. When did the problem start (time and date)? The breast pain started this morning. 2.Is patient pregnant? Yes              If marked YES, how many weeks? 5 weeks     If marked YES, is she having contractions? No                               3. If patient is complaining of pain, COMPLETE ALL OF THESE:          a.Level of pain (from 1-10): 5    b. Location of pain: Breast                                        c.When was last bowel movement (time and date)? This morning 9/26/23     d. Is she have nausea? Yes           If yes, when did it start (time and date)? This morning 9/26/23        e. Is she passing gas? Yes              f. Is she having vomiting? No             G. Anything make the pain better? No        Over the counter pain meds? No       h. Does the pain radiate anywhere:  No                            4.Does the patient have a fever? No              5.Does the patient have chills? No    6. Is the patient calling for a refill on medication? No                                Did you enter the pharmacy into the patient chart?  [facility]

## 2023-09-27 ENCOUNTER — HOSPITAL ENCOUNTER (OUTPATIENT)
Dept: ULTRASOUND IMAGING | Age: 28
Discharge: HOME | End: 2023-09-27
Attending: LEGAL MEDICINE
Payer: COMMERCIAL

## 2023-09-27 DIAGNOSIS — N91.5 SCANTY OR INFREQUENT MENSTRUATION: ICD-10-CM

## 2023-09-27 LAB
C TRACH DNA GENITAL QL NAA+PROBE: NEGATIVE
N. GONORRHOEAE DNA: NEGATIVE

## 2023-09-27 PROCEDURE — 76801 OB US < 14 WKS SINGLE FETUS: CPT

## 2023-09-29 LAB
CULTURE: NORMAL
SPECIMEN DESCRIPTION: NORMAL

## 2023-10-02 ENCOUNTER — ROUTINE PRENATAL (OUTPATIENT)
Age: 28
End: 2023-10-02

## 2023-10-02 VITALS
WEIGHT: 199 LBS | BODY MASS INDEX: 33.12 KG/M2 | DIASTOLIC BLOOD PRESSURE: 80 MMHG | HEART RATE: 95 BPM | SYSTOLIC BLOOD PRESSURE: 132 MMHG

## 2023-10-02 DIAGNOSIS — Z34.01 ENCOUNTER FOR SUPERVISION OF NORMAL FIRST PREGNANCY IN FIRST TRIMESTER: ICD-10-CM

## 2023-10-02 DIAGNOSIS — Z3A.01 5 WEEKS GESTATION OF PREGNANCY: Primary | ICD-10-CM

## 2023-10-02 DIAGNOSIS — Z34.81 ENCOUNTER FOR SUPERVISION OF OTHER NORMAL PREGNANCY IN FIRST TRIMESTER: ICD-10-CM

## 2023-10-02 RX ORDER — PNV NO.95/FERROUS FUM/FOLIC AC 28MG-0.8MG
1 TABLET ORAL DAILY
Qty: 30 TABLET | Refills: 5 | Status: SHIPPED | OUTPATIENT
Start: 2023-10-02

## 2023-10-02 NOTE — PATIENT INSTRUCTIONS
Patient Education   Please have your ultrasound done by next week. Have the blood work done at 10 weeks gestation, which would be approximately 4 weeks from today. Patient Education        Preeclampsia: Care Instructions  Preeclampsia is high blood pressure and signs of organ damage, usually after 20 weeks of pregnancy. If it's not managed, it can harm you or your baby and lead to dangerous seizures (eclampsia). Most people with preeclampsia have healthy babies. Preeclampsia usually goes away in the weeks after birth. In rare cases, symptoms of preeclampsia don't show up until days or weeks after childbirth. Monitor yourself for symptoms of preeclampsia. Call your doctor if you have symptoms such as a severe headache, vision changes, or sudden swelling in your face and hands. Keep track of your blood pressure at home if your doctor asks you to. Ask your doctor to make sure that the monitor is working and that you're using it right. Follow instructions about when to take your blood pressure and what to avoid before taking your blood pressure. Take medicines exactly as prescribed. You may need to take medicine to control your blood pressure. Don't smoke. If you smoke, quit or cut back as much as you can. If you need help quitting, talk to your doctor. Gain a healthy amount of weight. Talk with your doctor about how much weight gain is healthy for you. Gaining too much weight while you're pregnant may be harmful. When should you call for help? Share this information with your partner or a friend. They can help you watch for warning signs. Call 911  anytime you think you may need emergency care. For example, call if:    You passed out (lost consciousness). You have a seizure. You have trouble breathing. You have chest pain.    Call your doctor now or seek immediate medical care if:    You have symptoms of preeclampsia, such as:  Sudden swelling of your face, hands, or

## 2023-10-04 LAB
CULTURE: NO GROWTH
SPECIMEN DESCRIPTION: NORMAL

## 2023-10-11 ENCOUNTER — HOSPITAL ENCOUNTER (OUTPATIENT)
Dept: ULTRASOUND IMAGING | Age: 28
Discharge: HOME | End: 2023-10-11
Attending: LEGAL MEDICINE
Payer: COMMERCIAL

## 2023-10-11 DIAGNOSIS — Z3A.01 5 WEEKS GESTATION OF PREGNANCY: ICD-10-CM

## 2023-10-11 PROCEDURE — 76817 TRANSVAGINAL US OBSTETRIC: CPT

## 2023-10-12 ENCOUNTER — TELEPHONE (OUTPATIENT)
Age: 28
End: 2023-10-12

## 2023-10-12 DIAGNOSIS — O30.041 DICHORIONIC DIAMNIOTIC TWIN PREGNANCY IN FIRST TRIMESTER: Primary | ICD-10-CM

## 2023-10-12 NOTE — TELEPHONE ENCOUNTER
An order was placed for an appointment with maternal-fetal medicine for her. Please see if they can see her in the next 1 to 2 weeks.

## 2023-10-23 ENCOUNTER — TELEPHONE (OUTPATIENT)
Age: 28
End: 2023-10-23

## 2023-10-23 NOTE — TELEPHONE ENCOUNTER
I received a message that patient had canceled her OB appointment in [address] [place] [address]. There was a question whether she may have done it in error, and if staff needed to reach out to her to clarify this. Please go ahead and contact her, and clarify whether she wishes to continue her care here. Thank you.

## 2023-11-15 NOTE — PROGRESS NOTES
adenopathy. Does not bruise/bleed easily. Vitals:    11/21/23 1254   BP: 139/78   Pulse: 53        Physical Exam:  Physical Exam   Normal: Well developed               Well-nourished               Alert and oriented x4               Normal mood               No depression               No agitation               No anxiety    Constitutional: Conversant, no acute distress. Abdomen: Soft, nontender, no masses, no distention, no organomegaly, no hernia, normal bowel sounds. Lymphatics: Groin nodes no adenopathy. Pelvic: External genitalia: Within normal limits               tinea along her inner thighs bilaterally. Bladder urethra and meatus: Within normal limits           Vagina: No lesions                        No discharge          Cervix: Within normal limits            Uterus: Anteverted                        10 Weeks sized. Mobile nontender no masses. Adnexa: Nontender. No masses appreciated. .    Extremities: No clubbing cyanosis edema. Neuro: CN II to XII are grossly intact. She is alert and oriented x4. Danae was seen today for annual exam.    Diagnoses and all orders for this visit:    Cervical cancer screening  -     C.trachomatis N.gonorrhoeae DNA; Future  -     Trichomonas Screen; Future  -     PAP SMEAR; Future    Tinea    Other orders  -     clotrimazole-betamethasone (LOTRISONE) 1-0.05 % cream; Apply topically 2 times daily for 1 week. Return in about 1 year (around 11/21/2024).      Adelaide Krishnan MD

## 2023-11-21 ENCOUNTER — OFFICE VISIT (OUTPATIENT)
Age: 28
End: 2023-11-21
Payer: COMMERCIAL

## 2023-11-21 VITALS
BODY MASS INDEX: 34.16 KG/M2 | WEIGHT: 205 LBS | HEART RATE: 53 BPM | SYSTOLIC BLOOD PRESSURE: 139 MMHG | HEIGHT: 65 IN | DIASTOLIC BLOOD PRESSURE: 78 MMHG

## 2023-11-21 DIAGNOSIS — Z12.4 CERVICAL CANCER SCREENING: Primary | ICD-10-CM

## 2023-11-21 DIAGNOSIS — B35.9 TINEA: ICD-10-CM

## 2023-11-21 PROCEDURE — 1036F TOBACCO NON-USER: CPT | Performed by: LEGAL MEDICINE

## 2023-11-21 PROCEDURE — 99213 OFFICE O/P EST LOW 20 MIN: CPT | Performed by: LEGAL MEDICINE

## 2023-11-21 PROCEDURE — G8427 DOCREV CUR MEDS BY ELIG CLIN: HCPCS | Performed by: LEGAL MEDICINE

## 2023-11-21 PROCEDURE — G8417 CALC BMI ABV UP PARAM F/U: HCPCS | Performed by: LEGAL MEDICINE

## 2023-11-21 PROCEDURE — G8484 FLU IMMUNIZE NO ADMIN: HCPCS | Performed by: LEGAL MEDICINE

## 2023-11-21 RX ORDER — CLOTRIMAZOLE AND BETAMETHASONE DIPROPIONATE 10; .64 MG/G; MG/G
CREAM TOPICAL
Qty: 45 G | Refills: 0 | Status: SHIPPED | OUTPATIENT
Start: 2023-11-21

## 2023-11-21 ASSESSMENT — ENCOUNTER SYMPTOMS
NAUSEA: 0
VOMITING: 0
CONSTIPATION: 0
ABDOMINAL PAIN: 0
DIARRHEA: 0
ABDOMINAL DISTENTION: 0
RECTAL PAIN: 0
BLOOD IN STOOL: 0

## 2023-11-24 LAB
C TRACH DNA GENITAL QL NAA+PROBE: NEGATIVE
CULTURE: NORMAL
N. GONORRHOEAE DNA: NEGATIVE
SPECIMEN DESCRIPTION: NORMAL

## 2023-11-27 ENCOUNTER — TELEPHONE (OUTPATIENT)
Age: 28
End: 2023-11-27

## 2023-11-27 LAB
CULTURE: NORMAL
SPECIMEN DESCRIPTION: NORMAL

## 2023-11-27 NOTE — TELEPHONE ENCOUNTER
Patient called with questions on her pap and not having cervical cancer screening. States her insurance called to ask why, and if so it was coded wrong? ? Patient reports she has the HPV screening every 6 months.    Please advise

## 2023-11-27 NOTE — TELEPHONE ENCOUNTER
Patient called in and gave the [place] 229[address] from her insurance she stated they told her it was coded wrong for 11-21 appt.

## 2023-11-28 ENCOUNTER — TELEPHONE (OUTPATIENT)
Age: 28
End: 2023-11-28

## 2023-11-28 LAB — GYNECOLOGY CYTOLOGY REPORT: NORMAL

## 2023-11-28 NOTE — TELEPHONE ENCOUNTER
Patient called ans her GYN results posted on my chart. She is asking if she needs medication for the yeast and bacterial findings.    Please advise

## 2023-11-28 NOTE — TELEPHONE ENCOUNTER
When seen at the last visit, she denied any vaginal problems. Her only complaint was itching along the outer skin of the genitals. She was given a prescription for Lotrisone for that purpose. The incidental findings on Pap smear do not need to be treated.

## 2023-12-12 NOTE — TELEPHONE ENCOUNTER
Spoke with Rangel from [facility], she stated the provider information has been corrected and is going to reprocess the claim.  [id number]  Patient informed and verbalized understanding

## 2024-01-11 ENCOUNTER — TELEPHONE (OUTPATIENT)
Age: 29
End: 2024-01-11

## 2024-01-11 NOTE — TELEPHONE ENCOUNTER
Patient called and was asking about her OV on 11/21/2023 and what procedure was performed. She is having issues with her insurance company. She is going to call billing and request the billing codes.

## 2024-02-15 ENCOUNTER — OFFICE VISIT (OUTPATIENT)
Age: 29
End: 2024-02-15
Payer: COMMERCIAL

## 2024-02-15 VITALS
SYSTOLIC BLOOD PRESSURE: 170 MMHG | HEART RATE: 72 BPM | BODY MASS INDEX: 35.08 KG/M2 | WEIGHT: 210.8 LBS | DIASTOLIC BLOOD PRESSURE: 111 MMHG

## 2024-02-15 DIAGNOSIS — I10 HYPERTENSION, UNSPECIFIED TYPE: ICD-10-CM

## 2024-02-15 DIAGNOSIS — N89.8 VAGINAL DISCHARGE: Primary | ICD-10-CM

## 2024-02-15 PROCEDURE — G8484 FLU IMMUNIZE NO ADMIN: HCPCS | Performed by: LEGAL MEDICINE

## 2024-02-15 PROCEDURE — G8417 CALC BMI ABV UP PARAM F/U: HCPCS | Performed by: LEGAL MEDICINE

## 2024-02-15 PROCEDURE — 3080F DIAST BP >= 90 MM HG: CPT | Performed by: LEGAL MEDICINE

## 2024-02-15 PROCEDURE — 3077F SYST BP >= 140 MM HG: CPT | Performed by: LEGAL MEDICINE

## 2024-02-15 PROCEDURE — G8427 DOCREV CUR MEDS BY ELIG CLIN: HCPCS | Performed by: LEGAL MEDICINE

## 2024-02-15 PROCEDURE — 99213 OFFICE O/P EST LOW 20 MIN: CPT | Performed by: LEGAL MEDICINE

## 2024-02-15 PROCEDURE — 1036F TOBACCO NON-USER: CPT | Performed by: LEGAL MEDICINE

## 2024-02-15 RX ORDER — METRONIDAZOLE 500 MG/1
500 TABLET ORAL
Qty: 14 TABLET | Refills: 0 | Status: SHIPPED | OUTPATIENT
Start: 2024-02-15 | End: 2024-02-22

## 2024-02-15 NOTE — PROGRESS NOTES
Danae Parnell     Patient presents for vaginal discharge for 3 weeks.  No problems with her bowel or urinary function.  Does not have a history of high blood pressure.  Has anxiety.  Has a blood pressure machine at home.  No problems with her menses.  Flow is normal.  No dysmenorrhea or dyspareunia.          Past Medical History:   Diagnosis Date    Depression     Marijuana use during pregnancy         Past Surgical History:   Procedure Laterality Date    INDUCED   10/2023    TWINS        History reviewed. No pertinent family history.     Social History       Tobacco History       Smoking Status  Never      Smokeless Tobacco Use  Never              Alcohol History       Alcohol Use Status  No              Drug Use       Drug Use Status  No              Sexual Activity       Sexually Active  Never                      Current Outpatient Medications:     metroNIDAZOLE (FLAGYL) 500 MG tablet, Take 1 tablet by mouth in the morning and 1 tablet in the evening. Do all this for 7 days., Disp: 14 tablet, Rfl: 0    cariprazine hcl (VRAYLAR) 3 MG CAPS capsule, Take 1 capsule by mouth daily, Disp: , Rfl:     clotrimazole-betamethasone (LOTRISONE) 1-0.05 % cream, Apply topically 2 times daily for 1 week. (Patient not taking: Reported on 2/15/2024), Disp: 45 g, Rfl: 0    Prenatal Vit-Fe Fumarate-FA (PRENATAL VITAMINS) 28-0.8 MG TABS, Take 1 tablet by mouth daily (Patient not taking: Reported on 2023), Disp: 30 tablet, Rfl: 5    ibuprofen (IBU) 800 MG tablet, Take 1 tablet by mouth every 8 hours as needed for Pain, Disp: 21 tablet, Rfl: 0     No Known Allergies     Review of Systems   Constitutional:  Negative for appetite change, chills and fever.   Gastrointestinal:  Negative for abdominal distention, abdominal pain, blood in stool, constipation, diarrhea, nausea, rectal pain and vomiting.   Endocrine: Negative for heat intolerance, polydipsia, polyphagia and polyuria.   Genitourinary:  Positive for vaginal

## 2024-02-15 NOTE — PATIENT INSTRUCTIONS
Please check your blood pressures at home, and contact your family physician if they are 140/90 or higher.

## 2024-02-16 LAB
AMPHETAMINE SCREEN URINE: NEGATIVE
BARBITURATE SCREEN URINE: NEGATIVE
BENZODIAZEPINE SCREEN, URINE: NEGATIVE
BUPRENORPHINE URINE: NEGATIVE
CANNABINOID SCREEN URINE: POSITIVE
COCAINE METABOLITE, URINE: NEGATIVE
FENTANYL URINE: NEGATIVE
METHADONE SCREEN, URINE: NEGATIVE
OPIATES, URINE: NEGATIVE
OXYCODONE SCREEN URINE: NEGATIVE
PHENCYCLIDINE, URINE: NEGATIVE
TEST INFORMATION: ABNORMAL

## 2024-02-20 ENCOUNTER — TELEPHONE (OUTPATIENT)
Dept: ADMINISTRATIVE | Age: 29
End: 2024-02-20

## 2024-02-20 DIAGNOSIS — N89.8 VAGINAL DISCHARGE: ICD-10-CM

## 2024-02-20 NOTE — TELEPHONE ENCOUNTER
Pt called because she could not see her results in mychart and was concerned that there was possibly a reason why she could not see them.   Staff unavailable due to pt care.  Please contact pt.

## 2024-02-27 ENCOUNTER — HOSPITAL ENCOUNTER (EMERGENCY)
Age: 29
Discharge: OTHER INSTITUTION | End: 2024-02-28
Attending: EMERGENCY MEDICINE
Payer: COMMERCIAL

## 2024-02-27 DIAGNOSIS — F23 ACUTE PSYCHOSIS (HCC): Primary | ICD-10-CM

## 2024-02-27 DIAGNOSIS — F32.A DEPRESSION, UNSPECIFIED DEPRESSION TYPE: ICD-10-CM

## 2024-02-27 DIAGNOSIS — F22 PARANOIA (HCC): ICD-10-CM

## 2024-02-27 LAB
ALBUMIN SERPL-MCNC: 4.9 G/DL (ref 3.5–5.2)
ALP SERPL-CCNC: 87 U/L (ref 35–104)
ALT SERPL-CCNC: 20 U/L (ref 0–32)
AMPHET UR QL SCN: NEGATIVE
ANION GAP SERPL CALCULATED.3IONS-SCNC: 15 MMOL/L (ref 7–16)
APAP SERPL-MCNC: <5 UG/ML (ref 10–30)
AST SERPL-CCNC: 30 U/L (ref 0–31)
BACTERIA URNS QL MICRO: ABNORMAL
BARBITURATES UR QL SCN: NEGATIVE
BASOPHILS # BLD: 0.02 K/UL (ref 0–0.2)
BASOPHILS NFR BLD: 0 % (ref 0–2)
BENZODIAZ UR QL: NEGATIVE
BILIRUB SERPL-MCNC: 0.4 MG/DL (ref 0–1.2)
BILIRUB UR QL STRIP: ABNORMAL
BUN SERPL-MCNC: 11 MG/DL (ref 6–20)
BUPRENORPHINE UR QL: NEGATIVE
CALCIUM SERPL-MCNC: 10.3 MG/DL (ref 8.6–10.2)
CANNABINOIDS UR QL SCN: POSITIVE
CASTS #/AREA URNS LPF: ABNORMAL /LPF
CHLORIDE SERPL-SCNC: 100 MMOL/L (ref 98–107)
CLARITY UR: ABNORMAL
CO2 SERPL-SCNC: 24 MMOL/L (ref 22–29)
COCAINE UR QL SCN: NEGATIVE
COLOR UR: YELLOW
CREAT SERPL-MCNC: 1.1 MG/DL (ref 0.5–1)
EOSINOPHIL # BLD: 0 K/UL (ref 0.05–0.5)
EOSINOPHILS RELATIVE PERCENT: 0 % (ref 0–6)
EPI CELLS #/AREA URNS HPF: ABNORMAL /HPF
ERYTHROCYTE [DISTWIDTH] IN BLOOD BY AUTOMATED COUNT: 14.5 % (ref 11.5–15)
ETHANOLAMINE SERPL-MCNC: <10 MG/DL
FENTANYL UR QL: NEGATIVE
GFR SERPL CREATININE-BSD FRML MDRD: >60 ML/MIN/1.73M2
GLUCOSE SERPL-MCNC: 113 MG/DL (ref 74–99)
GLUCOSE UR STRIP-MCNC: NEGATIVE MG/DL
HCG, URINE, POC: NEGATIVE
HCT VFR BLD AUTO: 40.3 % (ref 34–48)
HGB BLD-MCNC: 13 G/DL (ref 11.5–15.5)
HGB UR QL STRIP.AUTO: NEGATIVE
IMM GRANULOCYTES # BLD AUTO: 0.04 K/UL (ref 0–0.58)
IMM GRANULOCYTES NFR BLD: 0 % (ref 0–5)
KETONES UR STRIP-MCNC: >80 MG/DL
LEUKOCYTE ESTERASE UR QL STRIP: NEGATIVE
LYMPHOCYTES NFR BLD: 1.54 K/UL (ref 1.5–4)
LYMPHOCYTES RELATIVE PERCENT: 14 % (ref 20–42)
Lab: NORMAL
MCH RBC QN AUTO: 27 PG (ref 26–35)
MCHC RBC AUTO-ENTMCNC: 32.3 G/DL (ref 32–34.5)
MCV RBC AUTO: 83.6 FL (ref 80–99.9)
METHADONE UR QL: NEGATIVE
MONOCYTES NFR BLD: 0.69 K/UL (ref 0.1–0.95)
MONOCYTES NFR BLD: 6 % (ref 2–12)
NEGATIVE QC PASS/FAIL: NORMAL
NEUTROPHILS NFR BLD: 80 % (ref 43–80)
NEUTS SEG NFR BLD: 9.12 K/UL (ref 1.8–7.3)
NITRITE UR QL STRIP: NEGATIVE
OPIATES UR QL SCN: NEGATIVE
OXYCODONE UR QL SCN: NEGATIVE
PCP UR QL SCN: NEGATIVE
PH UR STRIP: 6 [PH] (ref 5–9)
PLATELET # BLD AUTO: 416 K/UL (ref 130–450)
PMV BLD AUTO: 10.3 FL (ref 7–12)
POSITIVE QC PASS/FAIL: NORMAL
POTASSIUM SERPL-SCNC: 3.7 MMOL/L (ref 3.5–5)
PROT SERPL-MCNC: 9.6 G/DL (ref 6.4–8.3)
PROT UR STRIP-MCNC: 100 MG/DL
RBC # BLD AUTO: 4.82 M/UL (ref 3.5–5.5)
RBC #/AREA URNS HPF: ABNORMAL /HPF
SALICYLATES SERPL-MCNC: <0.3 MG/DL (ref 0–30)
SODIUM SERPL-SCNC: 139 MMOL/L (ref 132–146)
SP GR UR STRIP: >1.03 (ref 1–1.03)
TEST INFORMATION: ABNORMAL
TOXIC TRICYCLIC SC,BLOOD: NEGATIVE
UROBILINOGEN UR STRIP-ACNC: 1 EU/DL (ref 0–1)
WBC #/AREA URNS HPF: ABNORMAL /HPF
WBC OTHER # BLD: 11.4 K/UL (ref 4.5–11.5)

## 2024-02-27 PROCEDURE — 80143 DRUG ASSAY ACETAMINOPHEN: CPT

## 2024-02-27 PROCEDURE — G0480 DRUG TEST DEF 1-7 CLASSES: HCPCS

## 2024-02-27 PROCEDURE — 85025 COMPLETE CBC W/AUTO DIFF WBC: CPT

## 2024-02-27 PROCEDURE — 99285 EMERGENCY DEPT VISIT HI MDM: CPT

## 2024-02-27 PROCEDURE — 80179 DRUG ASSAY SALICYLATE: CPT

## 2024-02-27 PROCEDURE — 80307 DRUG TEST PRSMV CHEM ANLYZR: CPT

## 2024-02-27 PROCEDURE — 80053 COMPREHEN METABOLIC PANEL: CPT

## 2024-02-27 PROCEDURE — 96372 THER/PROPH/DIAG INJ SC/IM: CPT

## 2024-02-27 PROCEDURE — 6360000002 HC RX W HCPCS: Performed by: EMERGENCY MEDICINE

## 2024-02-27 PROCEDURE — 6360000002 HC RX W HCPCS: Performed by: STUDENT IN AN ORGANIZED HEALTH CARE EDUCATION/TRAINING PROGRAM

## 2024-02-27 PROCEDURE — 81001 URINALYSIS AUTO W/SCOPE: CPT

## 2024-02-27 RX ORDER — HALOPERIDOL 5 MG/ML
5 INJECTION INTRAMUSCULAR ONCE
Status: COMPLETED | OUTPATIENT
Start: 2024-02-27 | End: 2024-02-27

## 2024-02-27 RX ORDER — ZIPRASIDONE MESYLATE 20 MG/ML
20 INJECTION, POWDER, LYOPHILIZED, FOR SOLUTION INTRAMUSCULAR ONCE
Status: COMPLETED | OUTPATIENT
Start: 2024-02-27 | End: 2024-02-27

## 2024-02-27 RX ORDER — LORAZEPAM 2 MG/ML
2 INJECTION INTRAMUSCULAR ONCE
Status: COMPLETED | OUTPATIENT
Start: 2024-02-27 | End: 2024-02-27

## 2024-02-27 RX ORDER — DIPHENHYDRAMINE HYDROCHLORIDE 50 MG/ML
50 INJECTION INTRAMUSCULAR; INTRAVENOUS ONCE
Status: COMPLETED | OUTPATIENT
Start: 2024-02-27 | End: 2024-02-27

## 2024-02-27 RX ORDER — ACETAMINOPHEN 500 MG
1000 TABLET ORAL ONCE
Status: DISCONTINUED | OUTPATIENT
Start: 2024-02-27 | End: 2024-02-27

## 2024-02-27 RX ADMIN — DIPHENHYDRAMINE HYDROCHLORIDE 50 MG: 50 INJECTION INTRAMUSCULAR; INTRAVENOUS at 21:53

## 2024-02-27 RX ADMIN — LORAZEPAM 2 MG: 2 INJECTION INTRAMUSCULAR; INTRAVENOUS at 21:53

## 2024-02-27 RX ADMIN — ZIPRASIDONE MESYLATE 20 MG: 20 INJECTION, POWDER, LYOPHILIZED, FOR SOLUTION INTRAMUSCULAR at 11:15

## 2024-02-27 RX ADMIN — HALOPERIDOL LACTATE 5 MG: 5 INJECTION, SOLUTION INTRAMUSCULAR at 21:53

## 2024-02-27 ASSESSMENT — PAIN - FUNCTIONAL ASSESSMENT: PAIN_FUNCTIONAL_ASSESSMENT: NONE - DENIES PAIN
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
## 2024-02-27 NOTE — ED PROVIDER NOTES
[facility] EMERGENCY DEPARTMENT  EMERGENCY DEPARTMENT ENCOUNTER        Pt Name: Danae Parnell  MRN: [id number]  Birthdate 1995  Date of evaluation: 2024  Provider: Octavio Hadley DO  PCP: No primary care provider on file.  Note Started: 9:03 AM EST 24    CHIEF COMPLAINT       Chief Complaint   Patient presents with    Insomnia       HISTORY OF PRESENT ILLNESS: 1 or more Elements   History From: patient and her mother    Limitations to history : None    Danae Parnell is a 28 y.o. female who presents to the ED for evaluation of depression.  Patient denies suicidal or homicidal thoughts.  Patient states that she has been hearing voices.  She reports that they are not telling to hurt herself or kill anyone.  She states she is here because \"I just want to know who I am and what my purposes is.\"  Does not currently see a counselor.  Is on Vraylar.  States she has had prior suicide thoughts but not currently.  States she is compliant with her medication.  Denies any nausea or vomiting.  Denies any chest pain or abdominal pain.  Denies difficulty breathing.    Nursing Notes were all reviewed and agreed with or any disagreements were addressed in the HPI.      REVIEW OF EXTERNAL NOTE :        REVIEW OF SYSTEMS :           Positives and Pertinent negatives as per HPI.     SURGICAL HISTORY     Past Surgical History:   Procedure Laterality Date    INDUCED   10/2023    TWINS       CURRENTMEDICATIONS       Previous Medications    CARIPRAZINE HCL (VRAYLAR) 3 MG CAPS CAPSULE    Take 1 capsule by mouth daily    CLOTRIMAZOLE-BETAMETHASONE (LOTRISONE) 1-0.05 % CREAM    Apply topically 2 times daily for 1 week.    IBUPROFEN (IBU) 800 MG TABLET    Take 1 tablet by mouth every 8 hours as needed for Pain    PRENATAL VIT-FE FUMARATE-FA (PRENATAL VITAMINS) 28-0.8 MG TABS    Take 1 tablet by mouth daily       ALLERGIES     Patient has no known allergies.    FAMILYHISTORY

## 2024-02-27 NOTE — ED NOTES
Spoke with dr. Hadley, charge nurse and . Will remove 2 restraints at the next 15 min check at 1235 and as long as pt remains calm, will remove the last 2 restraints at the following 15 min check at 1250. Orders obtained.

## 2024-02-27 NOTE — ED NOTES
CALL PLACED TO [facility]ESS CENTER TO INITIATE BH TRANSFER; PER ACCESS CENTER, THEY ARE UNABLE TO TAKE PATIENT INFO UNTIL PATIENT IS OUT OF RESTRAINTS FOR AT LEAST 2 HOURS.

## 2024-02-27 NOTE — ED NOTES
Upon removing right wrist restrain, patient sat straight up and started shaking her head, however patient was able to redirected and was calmed down immediately. Right wrist and left ankle restraints were removed without incident. Patient remains calm and is back asleep at this time.

## 2024-02-27 NOTE — ED NOTES
Patient appearing more calm. Requesting the TV on. This RN did so. Patient attempting to sleep at this time. 1 to 1 CO continues at bedside.
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
## 2024-02-27 NOTE — ED TRIAGE NOTES
Pt states she hasn't been able to sleep. Pt states she is depressed, denies any SI, HI. Family is concerned that pt is having manic episode
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
## 2024-02-27 NOTE — ED NOTES
The last 2 restraints removed at this time per order from dr. Hadley. Upon removing the restraints, patient again sat up in bed and shook her head and then started crying. Patient reoriented and calmed down immediately. Patient sleeping. CO remains at bedside.

## 2024-02-28 VITALS
RESPIRATION RATE: 18 BRPM | OXYGEN SATURATION: 97 % | SYSTOLIC BLOOD PRESSURE: 148 MMHG | TEMPERATURE: 97.8 F | HEART RATE: 102 BPM | WEIGHT: 210 LBS | DIASTOLIC BLOOD PRESSURE: 90 MMHG | BODY MASS INDEX: 34.95 KG/M2

## 2024-02-28 LAB
SARS-COV-2 RDRP RESP QL NAA+PROBE: NOT DETECTED
SPECIMEN DESCRIPTION: NORMAL

## 2024-02-28 PROCEDURE — 87635 SARS-COV-2 COVID-19 AMP PRB: CPT

## 2024-02-28 PROCEDURE — 96374 THER/PROPH/DIAG INJ IV PUSH: CPT

## 2024-02-28 PROCEDURE — 6360000002 HC RX W HCPCS: Performed by: EMERGENCY MEDICINE

## 2024-02-28 PROCEDURE — 96372 THER/PROPH/DIAG INJ SC/IM: CPT

## 2024-02-28 RX ORDER — HALOPERIDOL 5 MG/ML
2 INJECTION INTRAMUSCULAR ONCE
Status: COMPLETED | OUTPATIENT
Start: 2024-02-28 | End: 2024-02-28

## 2024-02-28 RX ORDER — LORAZEPAM 2 MG/ML
1 INJECTION INTRAMUSCULAR EVERY 6 HOURS PRN
Status: DISCONTINUED | OUTPATIENT
Start: 2024-02-28 | End: 2024-02-28 | Stop reason: HOSPADM

## 2024-02-28 RX ADMIN — HALOPERIDOL LACTATE 2 MG: 5 INJECTION, SOLUTION INTRAMUSCULAR at 07:27

## 2024-02-28 RX ADMIN — LORAZEPAM 1 MG: 2 INJECTION INTRAMUSCULAR; INTRAVENOUS at 07:33

## 2024-02-28 ASSESSMENT — LIFESTYLE VARIABLES: HOW OFTEN DO YOU HAVE A DRINK CONTAINING ALCOHOL: NEVER
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
## 2024-02-28 NOTE — ED PROVIDER NOTES
HPI    Review of Systems    Physical Exam    Procedures    Medical Decision Making  Amount and/or Complexity of Data Reviewed  Labs: ordered.    Risk  Prescription drug management.          ED Course as of 02/28/24 0723 Tue Feb 27, 2024 0904 Met with Eber the ED .  He will meet with the patient and mother. [MS]   1017 Patient's behavior is escalating.  Spoke with the mother who is not comfortable with her going home stating that she needs help.  Discussed this with the .  We will place a pink slip and have her assessed. [MS]   1105 Patient had ran out of the ED.  Was obtained by the [facility] [employer] Department.  Increasingly agitated behavior.  There is risk for harm to herself or others.  Geodon IM has been ordered. [MS]   1105 Patient medically clear for screening by [facility] [MS]   1134 Patient required for hard restraints for increasing aggressive behavior or breaths to harm to herself and others. [MS]   1222 Patient now calm resting in the room. [MS]      ED Course User Index  [MS] Octavio Hadley DO   6:23 AM EST  I received this patient at sign out from Dr. Zimmer.  I have discussed the patient's initial exam, treatment and plan of care with the out going physician.  I have introduced my self to the patient / family and have answered their questions to this point.  I have examined the patient myself and reviewed ordered tests / medications and  reviewed any available results to this point.  If a resident is involved in the Emergency Department care, I have discussed my findings and plan with them as well.  7:20 AM  Patient becoming more agitated and tearful.  Having difficulty redirecting.  Will remedicate with Haldol and Ativan.  ED Course as of 02/28/24 0726 Tue Feb 27, 2024 0904 Met with Eber jones ED .  He will meet with the patient and mother. [MS]   1017 Patient's behavior is escalating.  Spoke with the mother who is not comfortable with her going home stating that
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
## 2024-02-28 NOTE — ED NOTES
Patient asking to speak with her mother.  Patient taken to private area of the ED accompanied with [employer] so that she could call her mother.  Patient was tearful during call, but remained calm.  After she hung she she stated that her mother was coming to see her.  Patient accompanied back to her room.

## 2024-02-28 NOTE — ED NOTES
Report given to physician ambulance for transfer to [facility] personal belongings sent with patient.
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
## 2024-03-14 ENCOUNTER — HOSPITAL ENCOUNTER (EMERGENCY)
Age: 29
Discharge: HOME | End: 2024-03-14
Payer: COMMERCIAL

## 2024-03-14 VITALS
BODY MASS INDEX: 34.95 KG/M2 | TEMPERATURE: 97.6 F | RESPIRATION RATE: 20 BRPM | WEIGHT: 210 LBS | SYSTOLIC BLOOD PRESSURE: 162 MMHG | HEART RATE: 87 BPM | DIASTOLIC BLOOD PRESSURE: 107 MMHG | OXYGEN SATURATION: 99 %

## 2024-03-14 DIAGNOSIS — B96.89 BACTERIAL VAGINOSIS: Primary | ICD-10-CM

## 2024-03-14 DIAGNOSIS — Z11.3 ENCOUNTER FOR SCREENING FOR BACTERIAL SEXUALLY TRANSMITTED DISEASE: ICD-10-CM

## 2024-03-14 DIAGNOSIS — N76.0 BACTERIAL VAGINOSIS: Primary | ICD-10-CM

## 2024-03-14 LAB
BACTERIA URNS QL MICRO: ABNORMAL
BILIRUB UR QL STRIP: NEGATIVE
C TRACH DNA SPEC QL PROBE+SIG AMP: NORMAL
CLARITY UR: CLEAR
CLUE CELLS VAG QL WET PREP: ABNORMAL
COLOR UR: YELLOW
EPI CELLS #/AREA URNS HPF: ABNORMAL /HPF
GLUCOSE UR STRIP-MCNC: NEGATIVE MG/DL
HCG UR QL: NEGATIVE
HGB UR QL STRIP.AUTO: NEGATIVE
KETONES UR STRIP-MCNC: NEGATIVE MG/DL
LEUKOCYTE ESTERASE UR QL STRIP: NEGATIVE
N GONORRHOEA DNA SPEC QL PROBE+SIG AMP: NORMAL
NITRITE UR QL STRIP: NEGATIVE
PH UR STRIP: 5.5 [PH] (ref 5–9)
PROT UR STRIP-MCNC: NEGATIVE MG/DL
RBC #/AREA URNS HPF: ABNORMAL /HPF
SOURCE WET PREP: ABNORMAL
SP GR UR STRIP: 1.02 (ref 1–1.03)
SPECIMEN DESCRIPTION: NORMAL
T VAGINALIS VAG QL WET PREP: ABNORMAL
UROBILINOGEN UR STRIP-ACNC: 0.2 EU/DL (ref 0–1)
WBC #/AREA URNS HPF: ABNORMAL /HPF
YEAST WET PREP: ABNORMAL

## 2024-03-14 PROCEDURE — 81001 URINALYSIS AUTO W/SCOPE: CPT

## 2024-03-14 PROCEDURE — 87210 SMEAR WET MOUNT SALINE/INK: CPT

## 2024-03-14 PROCEDURE — 6360000002 HC RX W HCPCS: Performed by: NURSE PRACTITIONER

## 2024-03-14 PROCEDURE — 99211 OFF/OP EST MAY X REQ PHY/QHP: CPT

## 2024-03-14 PROCEDURE — 87491 CHLMYD TRACH DNA AMP PROBE: CPT

## 2024-03-14 PROCEDURE — 2500000003 HC RX 250 WO HCPCS: Performed by: NURSE PRACTITIONER

## 2024-03-14 PROCEDURE — 96372 THER/PROPH/DIAG INJ SC/IM: CPT

## 2024-03-14 PROCEDURE — 84703 CHORIONIC GONADOTROPIN ASSAY: CPT

## 2024-03-14 PROCEDURE — 87591 N.GONORRHOEAE DNA AMP PROB: CPT

## 2024-03-14 RX ORDER — METRONIDAZOLE 500 MG/1
500 TABLET ORAL 2 TIMES DAILY
Qty: 14 TABLET | Refills: 0 | Status: SHIPPED | OUTPATIENT
Start: 2024-03-14 | End: 2024-03-21

## 2024-03-14 RX ORDER — ARIPIPRAZOLE 2 MG/1
2 TABLET ORAL DAILY
COMMUNITY

## 2024-03-14 RX ORDER — DOXYCYCLINE HYCLATE 100 MG
100 TABLET ORAL 2 TIMES DAILY
Qty: 14 TABLET | Refills: 0 | Status: SHIPPED | OUTPATIENT
Start: 2024-03-14 | End: 2024-03-21

## 2024-03-14 RX ADMIN — LIDOCAINE HYDROCHLORIDE 500 MG: 10 INJECTION, SOLUTION EPIDURAL; INFILTRATION; INTRACAUDAL; PERINEURAL at 09:50

## 2024-03-14 NOTE — ED PROVIDER NOTES
[facility]  Department of Emergency Medicine   UC  Encounter Note  Admit Date/RoomTime: 3/14/2024  9:10 AM   Room:     NAME: Danae Parnell  : 1995  MRN: [id number]     Chief Complaint:  Vaginitis (2-3 days pt has had vaginal itching, discharge. )    History of Present Illness       Danae Parnell is a 28 y.o. old female who presents to the urgent care with complaints of a 2 to 3-day history of vaginal pruritus, feeling \"off\" down there.  States that she did have unprotected sexual intercourse last week.  States that she would like tested for gonorrhea and chlamydia.  She is denying any abdominal cramping, fevers, pelvic pain, dysuria.  Has no history of STDs.  She is a  1 para 0.  Last menstrual period was 2/15/2024..      ROS   Pertinent positives and negatives are stated within HPI, all other systems reviewed and are negative.    Past Medical History:  has a past medical history of Depression, Marijuana use during pregnancy, and Marijuana use during pregnancy.    Surgical History:  has a past surgical history that includes Induced  (10/2023).    Social History:  reports that she has never smoked. She has never used smokeless tobacco. She reports that she does not drink alcohol and does not use drugs.    Family History: family history is not on file.     Allergies: Patient has no known allergies.    Physical Exam   Oxygen Saturation Interpretation: Normal.        ED Triage Vitals [24 0911]   BP Temp Temp src Pulse Respirations SpO2 Height Weight - Scale   (!) 162/107 97.6 °F (36.4 °C) -- 87 20 99 % -- 95.3 kg (210 lb)         General Appearance/Constitutional:  Alert, development consistent with age, NAD.  HEENT:  NC/NT. PERRLA.  Airway patent.  Neck:  Supple.  No lymphadenopathy.  Respiratory:  Clear to auscultation and breath sounds equal.  CV:  Regular rate and rhythm.  GI:  normal appearing, non-distended with no visible hernias.       Bowel sounds: normal bowel

## 2024-03-18 LAB
C TRACH DNA SPEC QL PROBE+SIG AMP: NEGATIVE
N GONORRHOEA DNA SPEC QL PROBE+SIG AMP: NEGATIVE
SPECIMEN DESCRIPTION: NORMAL

## 2024-06-26 ENCOUNTER — TELEPHONE (OUTPATIENT)
Dept: ADMINISTRATIVE | Age: 29
End: 2024-06-26

## 2024-06-27 ENCOUNTER — HOSPITAL ENCOUNTER (EMERGENCY)
Age: 29
Discharge: HOME | End: 2024-06-27
Attending: EMERGENCY MEDICINE
Payer: COMMERCIAL

## 2024-06-27 VITALS
BODY MASS INDEX: 32.95 KG/M2 | SYSTOLIC BLOOD PRESSURE: 148 MMHG | TEMPERATURE: 98.5 F | DIASTOLIC BLOOD PRESSURE: 97 MMHG | HEART RATE: 71 BPM | RESPIRATION RATE: 16 BRPM | WEIGHT: 198 LBS | OXYGEN SATURATION: 100 %

## 2024-06-27 DIAGNOSIS — N92.6 IRREGULAR MENSTRUAL BLEEDING: Primary | ICD-10-CM

## 2024-06-27 LAB — HCG UR QL: NEGATIVE

## 2024-06-27 PROCEDURE — 99283 EMERGENCY DEPT VISIT LOW MDM: CPT

## 2024-06-27 PROCEDURE — 84703 CHORIONIC GONADOTROPIN ASSAY: CPT

## 2024-06-27 NOTE — ED PROVIDER NOTES
HPI:  24,   Time: 5:25 PM EDT         Danae Parnell is a 28 y.o. female presenting to the ED for chief complaint: Patient presents today as she had breakthrough menstrual bleeding since her last period 2 weeks ago.  Unable to get a hold of OB/GYN so she came to our facility.      ROS:   Pertinent positives and negatives are stated within HPI, all other systems reviewed and are negative.  --------------------------------------------- PAST HISTORY ---------------------------------------------  Past Medical History:  has a past medical history of Depression, Marijuana use during pregnancy, and Marijuana use during pregnancy.    Past Surgical History:  has a past surgical history that includes Induced  (10/2023).    Social History:  reports that she has never smoked. She has never used smokeless tobacco. She reports that she does not drink alcohol and does not use drugs.    Family History: family history is not on file.     The patient’s home medications have been reviewed.    Allergies: Patient has no known allergies.    -------------------------------------------------- RESULTS -------------------------------------------------  All laboratory and radiology results have been personally reviewed by myself   LABS:  Results for orders placed or performed during the hospital encounter of 24   Pregnancy, Urine   Result Value Ref Range    Pregnancy, Urine NEGATIVE NEGATIVE       RADIOLOGY:  Interpreted by Radiologist.  No orders to display       ------------------------- NURSING NOTES AND VITALS REVIEWED ---------------------------   The nursing notes within the ED encounter and vital signs as below have been reviewed.   BP (!) 148/97   Pulse 71   Temp 98.5 °F (36.9 °C) (Temporal)   Resp 16   Wt 89.8 kg (198 lb)   LMP 2024 (Exact Date)   SpO2 100%   BMI 32.95 kg/m²   Oxygen Saturation Interpretation: Normal      ---------------------------------------------------PHYSICAL

## 2025-02-03 SDOH — ECONOMIC STABILITY: INCOME INSECURITY: IN THE LAST 12 MONTHS, WAS THERE A TIME WHEN YOU WERE NOT ABLE TO PAY THE MORTGAGE OR RENT ON TIME?: NO

## 2025-02-03 SDOH — ECONOMIC STABILITY: TRANSPORTATION INSECURITY
IN THE PAST 12 MONTHS, HAS THE LACK OF TRANSPORTATION KEPT YOU FROM MEDICAL APPOINTMENTS OR FROM GETTING MEDICATIONS?: NO

## 2025-02-03 SDOH — ECONOMIC STABILITY: TRANSPORTATION INSECURITY
IN THE PAST 12 MONTHS, HAS LACK OF TRANSPORTATION KEPT YOU FROM MEETINGS, WORK, OR FROM GETTING THINGS NEEDED FOR DAILY LIVING?: NO

## 2025-02-03 SDOH — ECONOMIC STABILITY: FOOD INSECURITY: WITHIN THE PAST 12 MONTHS, THE FOOD YOU BOUGHT JUST DIDN'T LAST AND YOU DIDN'T HAVE MONEY TO GET MORE.: PATIENT DECLINED

## 2025-02-03 SDOH — ECONOMIC STABILITY: FOOD INSECURITY: WITHIN THE PAST 12 MONTHS, YOU WORRIED THAT YOUR FOOD WOULD RUN OUT BEFORE YOU GOT MONEY TO BUY MORE.: PATIENT DECLINED

## 2025-02-05 ENCOUNTER — OFFICE VISIT (OUTPATIENT)
Age: 30
End: 2025-02-05
Payer: COMMERCIAL

## 2025-02-05 VITALS
SYSTOLIC BLOOD PRESSURE: 178 MMHG | WEIGHT: 202.1 LBS | HEIGHT: 66 IN | OXYGEN SATURATION: 100 % | RESPIRATION RATE: 16 BRPM | DIASTOLIC BLOOD PRESSURE: 116 MMHG | BODY MASS INDEX: 32.48 KG/M2 | TEMPERATURE: 97.7 F | HEART RATE: 65 BPM

## 2025-02-05 DIAGNOSIS — N89.8 VAGINAL DISCHARGE: ICD-10-CM

## 2025-02-05 DIAGNOSIS — N76.0 BV (BACTERIAL VAGINOSIS): ICD-10-CM

## 2025-02-05 DIAGNOSIS — B96.89 BV (BACTERIAL VAGINOSIS): ICD-10-CM

## 2025-02-05 DIAGNOSIS — R30.0 BURNING WITH URINATION: Primary | ICD-10-CM

## 2025-02-05 DIAGNOSIS — Z20.2 STD EXPOSURE: ICD-10-CM

## 2025-02-05 LAB
BILIRUBIN, POC: NEGATIVE
BLOOD URINE, POC: NEGATIVE
CLARITY, POC: CLEAR
COLOR, POC: YELLOW
GLUCOSE URINE, POC: NEGATIVE MG/DL
KETONES, POC: NEGATIVE MG/DL
LEUKOCYTE EST, POC: NEGATIVE
NITRITE, POC: NEGATIVE
PH, POC: 6
PROTEIN, POC: NEGATIVE MG/DL
SPECIFIC GRAVITY, POC: 1.02
UROBILINOGEN, POC: 0.2 MG/DL

## 2025-02-05 PROCEDURE — 81002 URINALYSIS NONAUTO W/O SCOPE: CPT | Performed by: OBSTETRICS & GYNECOLOGY

## 2025-02-05 PROCEDURE — 99459 PELVIC EXAMINATION: CPT | Performed by: OBSTETRICS & GYNECOLOGY

## 2025-02-05 PROCEDURE — G8427 DOCREV CUR MEDS BY ELIG CLIN: HCPCS | Performed by: OBSTETRICS & GYNECOLOGY

## 2025-02-05 PROCEDURE — 1036F TOBACCO NON-USER: CPT | Performed by: OBSTETRICS & GYNECOLOGY

## 2025-02-05 PROCEDURE — G8417 CALC BMI ABV UP PARAM F/U: HCPCS | Performed by: OBSTETRICS & GYNECOLOGY

## 2025-02-05 PROCEDURE — 99213 OFFICE O/P EST LOW 20 MIN: CPT | Performed by: OBSTETRICS & GYNECOLOGY

## 2025-02-05 ASSESSMENT — PATIENT HEALTH QUESTIONNAIRE - PHQ9
SUM OF ALL RESPONSES TO PHQ QUESTIONS 1-9: 0
SUM OF ALL RESPONSES TO PHQ QUESTIONS 1-9: 0
1. LITTLE INTEREST OR PLEASURE IN DOING THINGS: NOT AT ALL
2. FEELING DOWN, DEPRESSED OR HOPELESS: NOT AT ALL
SUM OF ALL RESPONSES TO PHQ QUESTIONS 1-9: 0
SUM OF ALL RESPONSES TO PHQ9 QUESTIONS 1 & 2: 0
SUM OF ALL RESPONSES TO PHQ QUESTIONS 1-9: 0

## 2025-02-05 NOTE — PROGRESS NOTES
Subjective:       Danae Parnell is a 29 y.o. female here for complaints of vaginal discharge and possible STD exposure..  Current Complaints: 29-year-old black female G1, P0 presents the office today noting she was having some mild vaginal discharge along with some dysuria.  Urine culture sent.  Note denies any vaginal bleeding pelvic pain or fevers.  No other complaints..  Personal Health Questionnaire Reviewed: yes.     Gynecologic History  Patient's last menstrual period was 2025 (exact date).  Contraception: none    OB History          1    Para        Term                AB        Living             SAB        IAB        Ectopic        Molar        Multiple        Live Births                  Past Medical History:   Diagnosis Date    Depression     Marijuana use during pregnancy     Marijuana use during pregnancy      There are no problems to display for this patient.   Past Surgical History:   Procedure Laterality Date    INDUCED   10/2023    TWINS     History reviewed. No pertinent family history.  Social History     Socioeconomic History    Marital status: Single     Spouse name: None    Number of children: None    Years of education: None    Highest education level: None   Tobacco Use    Smoking status: Never    Smokeless tobacco: Never   Substance and Sexual Activity    Alcohol use: No    Drug use: No    Sexual activity: Never     Social Determinants of Health     Financial Resource Strain: Low Risk  (2023)    Overall Financial Resource Strain (CARDIA)     Difficulty of Paying Living Expenses: Not hard at all   Food Insecurity: Patient Declined (2/3/2025)    Hunger Vital Sign     Worried About Running Out of Food in the Last Year: Patient declined     Ran Out of Food in the Last Year: Patient declined   Transportation Needs: No Transportation Needs (2/3/2025)    PRAPARE - Transportation     Lack of Transportation (Medical): No     Lack of Transportation (Non-Medical): No

## 2025-02-05 NOTE — PROGRESS NOTES
Pt presents for a possible yeast infection  Pharmacy, medications, allergies and history reviewed with pt  No other concerns at this time

## 2025-02-06 ENCOUNTER — TELEPHONE (OUTPATIENT)
Age: 30
End: 2025-02-06

## 2025-02-06 DIAGNOSIS — Z20.2 STD EXPOSURE: ICD-10-CM

## 2025-02-06 DIAGNOSIS — N89.8 VAGINAL DISCHARGE: ICD-10-CM

## 2025-02-06 RX ORDER — METRONIDAZOLE 500 MG/1
500 TABLET ORAL 2 TIMES DAILY
Qty: 14 TABLET | Refills: 0 | Status: SHIPPED | OUTPATIENT
Start: 2025-02-06 | End: 2025-02-13

## 2025-02-08 LAB
CULTURE: ABNORMAL
SPECIMEN DESCRIPTION: ABNORMAL

## 2025-03-06 ENCOUNTER — TELEPHONE (OUTPATIENT)
Dept: FAMILY MEDICINE CLINIC | Age: 30
End: 2025-03-06

## 2025-03-06 NOTE — TELEPHONE ENCOUNTER
Pt called to ask if she can establish care. She stated her mother, Sharifa Morales, is a current pt and referred her.

## 2025-04-01 ENCOUNTER — TELEPHONE (OUTPATIENT)
Dept: FAMILY MEDICINE CLINIC | Age: 30
End: 2025-04-01

## 2025-04-01 NOTE — TELEPHONE ENCOUNTER
I returned the patient's call and left a voicemail msg asking her to call back.      ----- Message from Forrest MUJICA sent at 4/1/2025  8:54 AM EDT -----  Regarding: [facility] Appointment Request  [facility] Appointment Request    Patient needs appointment for [facility] Appointment Type: New Patient.    Patient Requested Dates(s): sometime next week  Patient Requested Time:Morning  Provider Name:Jaydon Garcia MD    Reason for Appointment Request: Other Patient has an appointment on the 2nd of April and wanted to change it for some other time next week  --------------------------------------------------------------------------------------------------------------------------    Relationship to Patient: Self     Call Back Information: OK to leave message on voicemail  Preferred Call Back Number: Phone [phone]

## 2025-04-26 ENCOUNTER — HOSPITAL ENCOUNTER (EMERGENCY)
Age: 30
Discharge: HOME | End: 2025-04-26
Attending: EMERGENCY MEDICINE
Payer: COMMERCIAL

## 2025-04-26 VITALS
TEMPERATURE: 97.5 F | HEART RATE: 76 BPM | OXYGEN SATURATION: 98 % | RESPIRATION RATE: 18 BRPM | DIASTOLIC BLOOD PRESSURE: 98 MMHG | SYSTOLIC BLOOD PRESSURE: 184 MMHG

## 2025-04-26 DIAGNOSIS — J01.90 ACUTE SINUSITIS, RECURRENCE NOT SPECIFIED, UNSPECIFIED LOCATION: Primary | ICD-10-CM

## 2025-04-26 DIAGNOSIS — H10.30 ACUTE CONJUNCTIVITIS, UNSPECIFIED ACUTE CONJUNCTIVITIS TYPE, UNSPECIFIED LATERALITY: ICD-10-CM

## 2025-04-26 PROCEDURE — 99283 EMERGENCY DEPT VISIT LOW MDM: CPT

## 2025-04-26 RX ORDER — BROMPHENIRAMINE MALEATE, PSEUDOEPHEDRINE HYDROCHLORIDE, AND DEXTROMETHORPHAN HYDROBROMIDE 2; 30; 10 MG/5ML; MG/5ML; MG/5ML
5 SYRUP ORAL 4 TIMES DAILY PRN
Qty: 120 ML | Refills: 0 | Status: SHIPPED | OUTPATIENT
Start: 2025-04-26

## 2025-04-26 RX ORDER — TOBRAMYCIN 3 MG/ML
1 SOLUTION/ DROPS OPHTHALMIC EVERY 6 HOURS
Qty: 5 ML | Refills: 0 | Status: SHIPPED | OUTPATIENT
Start: 2025-04-26 | End: 2025-05-06

## 2025-04-26 ASSESSMENT — ENCOUNTER SYMPTOMS
EYE REDNESS: 1
VOMITING: 0
ABDOMINAL DISTENTION: 0
BACK PAIN: 0
SORE THROAT: 0
RHINORRHEA: 1
NAUSEA: 0
EYE ITCHING: 1
EYE DISCHARGE: 0
WHEEZING: 0
DIARRHEA: 0
COUGH: 0
EYE PAIN: 0
SHORTNESS OF BREATH: 0
SINUS PRESSURE: 0

## 2025-04-26 ASSESSMENT — PAIN DESCRIPTION - PAIN TYPE: TYPE: ACUTE PAIN

## 2025-04-26 ASSESSMENT — PAIN SCALES - GENERAL: PAINLEVEL_OUTOF10: 5

## 2025-04-26 ASSESSMENT — PAIN DESCRIPTION - DESCRIPTORS: DESCRIPTORS: DISCOMFORT

## 2025-04-26 ASSESSMENT — PAIN - FUNCTIONAL ASSESSMENT
PAIN_FUNCTIONAL_ASSESSMENT: 0-10
PAIN_FUNCTIONAL_ASSESSMENT: PREVENTS OR INTERFERES SOME ACTIVE ACTIVITIES AND ADLS

## 2025-04-26 ASSESSMENT — PAIN DESCRIPTION - FREQUENCY: FREQUENCY: CONTINUOUS

## 2025-04-26 ASSESSMENT — PAIN DESCRIPTION - LOCATION: LOCATION: THROAT

## 2025-04-26 NOTE — ED PROVIDER NOTES
The history is provided by the patient.   Illness   The current episode started 3 to 5 days ago. The onset was gradual. The problem is moderate. Associated symptoms include eye itching, congestion, headaches, rhinorrhea and eye redness. Pertinent negatives include no fever, no diarrhea, no nausea, no vomiting, no ear pain, no sore throat, no cough, no wheezing, no rash, no eye discharge and no eye pain.        Review of Systems   Constitutional:  Negative for chills and fever.   HENT:  Positive for congestion and rhinorrhea. Negative for ear pain, sinus pressure and sore throat.    Eyes:  Positive for redness and itching. Negative for pain and discharge.   Respiratory:  Negative for cough, shortness of breath and wheezing.    Cardiovascular:  Negative for chest pain.   Gastrointestinal:  Negative for abdominal distention, diarrhea, nausea and vomiting.   Genitourinary:  Negative for dysuria and frequency.   Musculoskeletal:  Negative for arthralgias and back pain.   Skin:  Negative for rash and wound.   Neurological:  Positive for headaches. Negative for weakness.   Hematological:  Negative for adenopathy.   All other systems reviewed and are negative.       Physical Exam  Vitals and nursing note reviewed.   Constitutional:       Appearance: She is well-developed.   HENT:      Head: Normocephalic and atraumatic.      Right Ear: Tympanic membrane is retracted.      Left Ear: Tympanic membrane is retracted.      Nose: Mucosal edema, congestion and rhinorrhea present.   Eyes:      Conjunctiva/sclera:      Left eye: Left conjunctiva is injected.      Pupils: Pupils are equal, round, and reactive to light.   Cardiovascular:      Rate and Rhythm: Normal rate and regular rhythm.      Heart sounds: Normal heart sounds. No murmur heard.  Pulmonary:      Effort: Pulmonary effort is normal. No respiratory distress.      Breath sounds: Normal breath sounds. No wheezing or rales.   Abdominal:      General: Bowel sounds are

## 2025-04-28 ENCOUNTER — TELEPHONE (OUTPATIENT)
Dept: FAMILY MEDICINE CLINIC | Age: 30
End: 2025-04-28

## 2025-04-28 NOTE — TELEPHONE ENCOUNTER
I called patient to confirm her NTP appt for 4/29/25 and patient stated she has to cancel the appt because she has a lot going on and she is also dealing with some sinus issues.  Patient stated she will need to call back to RS.  I informed patient that this is the second NTP appt she RS/cancelled this month.  I informed her that I will need to send a msg.

## 2025-05-01 NOTE — TELEPHONE ENCOUNTER
Yes, it is okay to reschedule.  She can be advised to wait until she is ready to make the appointment to reschedule if she is too busy right now.

## 2025-05-01 NOTE — TELEPHONE ENCOUNTER
I called patient and informed her, as noted by Dr. Garcia.  Patient stated she will call back when her schedule is not too busy and will RS.

## 2025-08-08 ENCOUNTER — OFFICE VISIT (OUTPATIENT)
Age: 30
End: 2025-08-08

## 2025-08-08 VITALS
HEART RATE: 77 BPM | TEMPERATURE: 98.1 F | OXYGEN SATURATION: 98 % | DIASTOLIC BLOOD PRESSURE: 111 MMHG | SYSTOLIC BLOOD PRESSURE: 178 MMHG | WEIGHT: 186 LBS | BODY MASS INDEX: 30.99 KG/M2 | HEIGHT: 65 IN | RESPIRATION RATE: 18 BRPM

## 2025-08-08 DIAGNOSIS — I10 HYPERTENSION, UNSPECIFIED TYPE: ICD-10-CM

## 2025-08-08 DIAGNOSIS — N89.8 ITCHING IN THE VAGINAL AREA: Primary | ICD-10-CM

## 2025-08-08 RX ORDER — FLUCONAZOLE 150 MG/1
150 TABLET ORAL ONCE
Qty: 1 TABLET | Refills: 0 | Status: SHIPPED | OUTPATIENT
Start: 2025-08-08 | End: 2025-08-08

## 2025-08-11 DIAGNOSIS — I10 HYPERTENSION, UNSPECIFIED TYPE: ICD-10-CM

## 2025-08-11 DIAGNOSIS — N89.8 ITCHING IN THE VAGINAL AREA: ICD-10-CM
